# Patient Record
Sex: MALE | Race: WHITE | Employment: OTHER | URBAN - METROPOLITAN AREA
[De-identification: names, ages, dates, MRNs, and addresses within clinical notes are randomized per-mention and may not be internally consistent; named-entity substitution may affect disease eponyms.]

---

## 2017-01-10 ENCOUNTER — OFFICE VISIT (OUTPATIENT)
Dept: DERMATOLOGY | Facility: AMBULATORY SURGERY CENTER | Age: 72
End: 2017-01-10

## 2017-01-10 VITALS
BODY MASS INDEX: 28.75 KG/M2 | TEMPERATURE: 97.9 F | SYSTOLIC BLOOD PRESSURE: 126 MMHG | WEIGHT: 224 LBS | RESPIRATION RATE: 18 BRPM | DIASTOLIC BLOOD PRESSURE: 84 MMHG | OXYGEN SATURATION: 97 % | HEART RATE: 76 BPM | HEIGHT: 74 IN

## 2017-01-10 DIAGNOSIS — C44.319 BASAL CELL CARCINOMA OF LEFT FOREHEAD: Primary | ICD-10-CM

## 2017-01-10 DIAGNOSIS — D49.2 NEOPLASM OF SKIN OF SCALP: ICD-10-CM

## 2017-01-10 DIAGNOSIS — L82.0 SEBORRHEIC KERATOSES, INFLAMED: ICD-10-CM

## 2017-01-10 RX ORDER — PHENYTOIN SODIUM 100 MG/1
200 CAPSULE, EXTENDED RELEASE ORAL 2 TIMES DAILY
COMMUNITY
Start: 2016-11-03

## 2017-01-10 RX ORDER — GLUCOSAMINE SULFATE 1500 MG
5000 POWDER IN PACKET (EA) ORAL DAILY
COMMUNITY
End: 2021-10-25

## 2017-01-10 RX ORDER — MELOXICAM 15 MG/1
15 TABLET ORAL DAILY
COMMUNITY
End: 2021-11-04

## 2017-01-10 RX ORDER — PHENOL/SODIUM PHENOLATE
20 AEROSOL, SPRAY (ML) MUCOUS MEMBRANE DAILY
COMMUNITY

## 2017-01-10 RX ORDER — LIDOCAINE HYDROCHLORIDE AND EPINEPHRINE 10; 10 MG/ML; UG/ML
3 INJECTION, SOLUTION INFILTRATION; PERINEURAL ONCE
Qty: 3 ML | Refills: 0
Start: 2017-01-10 | End: 2017-01-10

## 2017-01-10 RX ORDER — IRBESARTAN 150 MG/1
150 TABLET ORAL
COMMUNITY
Start: 2016-11-03

## 2017-01-10 RX ORDER — BISMUTH SUBSALICYLATE 262 MG
1 TABLET,CHEWABLE ORAL DAILY
COMMUNITY
End: 2021-10-25

## 2017-01-10 RX ORDER — ASPIRIN 81 MG/1
TABLET ORAL DAILY
COMMUNITY
End: 2021-10-25

## 2017-01-10 RX ORDER — BUPIVACAINE HYDROCHLORIDE AND EPINEPHRINE 2.5; 5 MG/ML; UG/ML
INJECTION, SOLUTION INFILTRATION; PERINEURAL
Qty: 1.5 ML | Refills: 0
Start: 2017-01-10 | End: 2018-04-09 | Stop reason: ALTCHOICE

## 2017-01-10 NOTE — PROGRESS NOTES
This note is written by Yahaira Rodrigues, as dictated by Jd Escobedo. Ana M Merchant MD.    CC: Basal cell carcinoma on the left forehead    History of present illness:     Thais Acevedo is a 66 y.o. male referred by Dr. Gamal Sim. He has a biopsy-proven basal cell carcinoma on the left forehead. This is a new basal cell carcinoma present for more than 6 months described as a persistent, scabbing lesion, no pain, bleeding or itching and with no prior treatment. Biopsy confirmed the diagnosis of basal cell carcinoma, and I reviewed the written pathology report. He notes that he has several other scabbed lesions on his scalp, he believes irritated from treatment with a lice comb. He is feeling well and in his usual state of health today. He has no pain, no current illnesses, no other skin concerns. His allergies, medications, medical, and social history are reviewed by me today. He has a history of actinic keratoses. Exam:     He is an awake, alert, and oriented 66 y.o. male who appears well and in no distress. There is no preauricular, submandibular, or cervical lymphadenopathy. I examined his face and scalp. He has an 8 x 4 mm white and pink papule with scar on his left forehead corresponding to Dr. Bryan Jordan. He confirms location. There are scattered seborrheic keratoses on his scalp. He has an 8 x 8 mm pink papule with scar on his right vertex scalp, concerning for skin cancer. Assessment/plan:    1. Basal cell carcinoma, left forehead. I discussed the diagnosis of basal cell carcinoma and summarized the pathology report. Mohs surgery is indicated by site, size and poor definition. The procedure was discussed, verbal and written consent were obtained. I performed the procedure. One stage was required to reach a tumor free plane. The surgical defect was managed with intermediate repair. There were no complications. He will follow up as needed as the site heals.     Indications, risks, and options were discussed with Klaudia Hill preoperatively. Risks including, but not limited to: pain, bleeding, infection, tumor recurrence, scarring and damage to motor and/or sensory nerves, were discussed. Klaudia Hill chose Mohs surgery. Klaudia Hill was an acceptable surgery candidate. Klaudia Hill was placed in the appropriate position on the operating table in the Mohs surgery procedure room. The area was prepped and draped in the standard manner. Gentian violet was used to outline the clinical margins of the tumor. Local anesthesia was then obtained. The grossly visible tumor was then removed, an underlying layer was excised and mapped according to the Mohs technique, and the individual specimens examined microscopically. The process was repeated until microscopic examination of the tissue specimens confirmed a tumor-free plane. Hemostasis was obtained with electrosurgery and pressure. The wound was covered between stages with moist saline gauze. Wound care instructions (written and verbal) and a follow up appointment were given to Klaudia Hill before discharge. Klaudia Hill was discharged in good condition. The wound management options of second intent healing, layered closure, local flap, and/or full thickness skin graft were discussed. Klaudia Hill understands the aims, risks, alternatives, and possible complications and elects to proceed with an intermediate layered closure. Wound margins were made vertical, edges undermined in the subcutaneous plane, standing cones corrected at both poles followed by layered closure. The wound was closed with buried 6-0 polysorb suture in the subcutis to reduce tension on the skin edges, and skin edges were approximated with 6-0 polysorb suture in the dermis to reduce tension on the epidermis. The final closure length was 14 mm. The wound was bandaged with a pressure bandage utilizing skin glue, Telfa, gauze and Coverroll.  Wound care instructions (written and verbal) and a follow up appointment were given to Emmanuel Vargas before discharge. Emmanuel Vargas was discharged in good condition. 2. New growing lesion, right vertex scalp. I discussed the diagnosis and recommend that he follow up with Dr. Wilver Cisneros for observation and possible management. 3. History of actinic keratoses and skin cancer. I discussed the diagnosis and recommend routine examinations with Dr. Wilver Cisneros for surveillance. 4. Seborrheic keratoses on the scalp, irritated. I reviewed the diagnosis with him and reassured him. No treatment is necessary. The documentation recorded by the scribe accurately reflects the service I personally performed and the decisions made by me. [facility]   OFFICE PROCEDURE PROGRESS NOTE     Chart reviewed for the following:     Kayleigh Martinez MD, have reviewed the History, Physical and updated the Allergic reactions for [address] performed immediately prior to start of procedure:     Kayleigh Martinez MD, have performed the following reviews on Emmanuel Vargas prior to the start of the procedure:     * Patient was identified by name and date of birth   * Agreement on procedure being performed was verified   * Risks and Benefits explained to the patient   * Procedure site verified and marked as necessary   * Patient was positioned for comfort   * Consent was signed and verified     Time: 1:20 PM  Date of procedure: 1/10/2017  Procedure performed by: Olvin Martinez MD   Provider assisted by: LPN   Patient assisted by: self   How tolerated by patient: tolerated the procedure well with no complications   Comments: none

## 2017-01-10 NOTE — PATIENT INSTRUCTIONS
WOUND CARE INSTRUCTIONS    1. Keep the dressing clean and dry and do not remove for 48 hours. 2. Then change the dressing once a day as follows:  a. Wash hands before and after each dressing change. b. Remove dressing and wash site gently with mild soap and water, rinse, and pat dry.  c. Apply an ointment (Bacitracin, Polysporin, Neosporin, Petroleum jelly or Aquaphor). d. Apply a non-stick (Telfa) dressing or Band-Aid to cover the wound. Remove pressure bandage Thursday. You may shower daily at this point, no bandage necessary. Glue will eventually come off within the next 2 weeks. If you still feel rough glue at this point, you may apply vaseline to site daily until fully removed. 3. Watch for:  BLEEDING: A small amount of drainage may occur. If bleeding occurs, elevate and rest the surgery site. Apply gauze and steady pressure for 15 minutes. If bleeding continues, call this office. INFECTION: Signs of infection include increased redness, pain, warmth, drainage of pus, and fever. If this occurs, call this office. 4. Special Instructions (follow any that are checked):  · [] You have stitches that need to be removed in 0 days  · [x] Avoid bending at the waist and heavy lifting for two days. · [x] Sleep with your head elevated for the next two nights. · [x] Rest the surgery site and keep it elevated as much as possible for two days. · [x] You may apply an ice-pack for 10-15 minutes every waking hour for the rest of the day. · [] Eat a soft diet and avoid hot food and hot drinks for the rest of the day. · [] Other instructions: Follow up as needed  Take Tylenol for pain as needed. Once the site is healed with no remaining bandages or open areas, protect your surgical site and scar from the sun, as this area will be more sensitive.   Use a broad spectrum sunscreen SPF 30 or higher daily, and a chemical free product (one containing zinc oxide or titanium dioxide) is a good choice if the area is sensitive. You may begin to gently massage the surgical site in 2-3 weeks, rubbing in a circular motion along the scar. This can help reduce swelling and thickness of a scar. A scar cream may be used beginnning 1 month after the surgery. If you have any questions or concerns, please call our office Monday through Friday at [phone].

## 2017-01-10 NOTE — PROGRESS NOTES
Pre-op: Patient present today for the evaluation of basal cell carcinoma to the left forehead. Procedure explained with full understanding. Vitals:    01/10/17 1307   BP: 126/84   Pulse: 76   Resp: 18   Temp: 97.9 °F (36.6 °C)   TempSrc: Oral   SpO2: 97%   Weight: 101.6 kg (224 lb)   Height: 6' 1.5\" (1.867 m)     preoperatively, will continue to monitor. Post-op: Written and verbal post-op wound care instructions given to patient with full understanding of care. Surgical wound bandaged with Vaseline, Telfa, 2x2 gauze, and coverall tape. All questions and concerns addressed. Vitals stable postoperatively.

## 2017-01-10 NOTE — MR AVS SNAPSHOT
Visit Information Date & Time Provider Department Dept. Phone Encounter #  
 1/10/2017  1:00 PM Cons[facility] Hab[facility] [phone] Upcoming Health Maintenance Date Due DTaP/Tdap/Td series (1 - Tdap) 12/7/1964 FOBT Q 1 YEAR AGE 50-75 12/7/1993 ZOSTER VACCINE AGE 60> 12/7/2003 GLAUCOMA SCREENING Q2Y 12/7/2008 Pneumococcal 65+ Low/Medium Risk (1 of 2 - PCV13) 12/7/2008 MEDICARE YEARLY EXAM 12/7/2008 INFLUENZA AGE 9 TO ADULT 8/1/2016 Allergies as of 1/10/2017  Review Complete On: 1/10/2017 By: Pati Dale LPN No Known Allergies Current Immunizations  Never Reviewed No immunizations on file. Not reviewed this visit You Were Diagnosed With   
  
 Codes Comments Basal cell carcinoma of left forehead    -  Primary ICD-10-CM: C44.319 ICD-9-CM: 173.31 Vitals BP Pulse Temp Resp Height(growth percentile) Weight(growth percentile) 126/84 (BP 1 Location: Left arm, BP Patient Position: Sitting) 76 97.9 °F (36.6 °C) (Oral) 18 6' 1.5\" (1.867 m) 224 lb (101.6 kg) SpO2 BMI Smoking Status 97% 29.15 kg/m2 Never Smoker BMI and BSA Data Body Mass Index Body Surface Area  
 29.15 kg/m 2 2.3 m 2 Your Updated Medication List  
  
   
This list is accurate as of: 1/10/17  1:50 PM.  Always use your most recent med list.  
  
  
  
  
 aspirin delayed-release 81 mg tablet Take  by mouth daily. irbesartan 150 mg tablet Commonly known as:  AVAPRO  
  
 LOSARTAN PO Take 150 mg by mouth daily. meloxicam 15 mg tablet Commonly known as:  MOBIC Take 15 mg by mouth daily. As needed  
  
 multivitamin tablet Commonly known as:  ONE A DAY Take 1 Tab by mouth daily. Omeprazole delayed release 20 mg tablet Commonly known as:  PRILOSEC D/R Take 20 mg by mouth daily. phenytoin  mg ER capsule Commonly known as:  DILANTIN ER  
  
 VITAMIN D3 1,000 unit Cap Generic drug:  cholecalciferol Take 5,000 Units by mouth daily. Patient Instructions WOUND CARE INSTRUCTIONS 1. Keep the dressing clean and dry and do not remove for 48 hours. 2. Then change the dressing once a day as follows: 
a. Wash hands before and after each dressing change. b. Remove dressing and wash site gently with mild soap and water, rinse, and pat dry. 
c. Apply an ointment (Bacitracin, Polysporin, Neosporin, Petroleum jelly or Aquaphor). d. Apply a non-stick (Telfa) dressing or Band-Aid to cover the wound. Remove pressure bandage Thursday. You may shower daily at this point, no bandage necessary. Glue will eventually come off within the next 2 weeks. If you still feel rough glue at this point, you may apply vaseline to site daily until fully removed. 3. Watch for: BLEEDING: A small amount of drainage may occur. If bleeding occurs, elevate and rest the surgery site. Apply gauze and steady pressure for 15 minutes. If bleeding continues, call this office. INFECTION: Signs of infection include increased redness, pain, warmth, drainage of pus, and fever. If this occurs, call this office. 4. Special Instructions (follow any that are checked): ·  You have stitches that need to be removed in 0 days ·  Avoid bending at the waist and heavy lifting for two days. ·  Sleep with your head elevated for the next two nights. ·  Rest the surgery site and keep it elevated as much as possible for two days. ·  You may apply an ice-pack for 10-15 minutes every waking hour for the rest of the day. ·  Eat a soft diet and avoid hot food and hot drinks for the rest of the day. ·  Other instructions: Follow up as needed Take Tylenol for pain as needed.  
 
 
Once the site is healed with no remaining bandages or open areas, protect your surgical site and scar from the sun, as this area will be more sensitive. Use a broad spectrum sunscreen SPF 30 or higher daily, and a chemical free product (one containing zinc oxide or titanium dioxide) is a good choice if the area is sensitive. You may begin to gently massage the surgical site in 2-3 weeks, rubbing in a circular motion along the scar. This can help reduce swelling and thickness of a scar. A scar cream may be used beginnning 1 month after the surgery. If you have any questions or concerns, please call our office Monday through Friday at [phone]. Introducing [facility] & HEALTH SERVICES! Andrey Doss introduces [employer] patient portal. Now you can access parts of your medical record, email your doctor's office, and request medication refills online. 1. In your internet browser, go to [email or web address] Plug 2. Click on the First Time User? Click Here link in the Sign In box. You will see the New Member Sign Up page. 3. Enter your Mapbox Access Code exactly as it appears below. You will not need to use this code after youve completed the sign-up process. If you do not sign up before the expiration date, you must request a new code. · Mapbox Access Code: [email or web address] Expires: 4/10/2017  1:49 PM 
 
4. Enter the last four digits of your Social Security Number (xxxx) and Date of Birth (mm/dd/yyyy) as indicated and click Submit. You will be taken to the next sign-up page. 5. Create a Mapbox ID. This will be your Mapbox login ID and cannot be changed, so think of one that is secure and easy to remember. 6. Create a Mapbox password. You can change your password at any time. 7. Enter your Password Reset Question and Answer. This can be used at a later time if you forget your password. 8. Enter your e-mail address. You will receive e-mail notification when new information is available in [address] 9. Click Sign Up. You can now view and download portions of your medical record. 10. Click the Download Summary menu link to download a portable copy of your medical information. If you have questions, please visit the Frequently Asked Questions section of the [employer] website. Remember, [employer] is NOT to be used for urgent needs. For medical emergencies, dial 911. Now available from your iPhone and Android! Please provide this summary of care documentation to your next provider. If you have any questions after today's visit, please call [phone].

## 2018-03-28 ENCOUNTER — TELEPHONE (OUTPATIENT)
Dept: DERMATOLOGY | Facility: AMBULATORY SURGERY CENTER | Age: 73
End: 2018-03-28

## 2018-03-28 NOTE — TELEPHONE ENCOUNTER
Mohs Pre-Op Assessment    Patient Appointment Date: 4/9 @     Vilma Harper, 74 y.o., male      does confirm site: R superior parietal scalp  Brief description of tumor: BCC  does ID site. (Can they still visibly see the site)  does not have Hepatitis C   does not have HIV (If YES, set up consult appointment)    Allergies:  No Known Allergies    does not have an Electrical Implanted Device (Pacemaker, AICD, brain stimulator, etc.)    does not need antibiotics      is not taking NSAIDs    is taking aspirin  If yes, is taking because of prevention (i.e. heart attack, stroke, TIA, bypass surgery, etc.)    is not taking Garlic  is not taking Ginkgo  is not taking Ginseng  is not taking Fish oils  is not taking Vit E    does not take a blood thinner(i.e. Coumadin/Warfarin, Plavix, Brilinta, Pradaxa, Xarelto, Effient)  If taking Coumadin needs to have PT/INR drawn and faxed results within a week of surgery    Pre operative assessment questions asked to patient. Patient has a general understanding of the procedure, and has been versed that there will be local anesthesia used in the procedure and that He will be ok to drive themselves to and from the appointment. Patient has been notified to arrive 15-20 minutes early and they may eat or drink before arriving.

## 2018-03-28 NOTE — TELEPHONE ENCOUNTER
LVM regarding MOHs pre op assessment. Pt instructed to call back at earliest convenience.      Appointment on Mon 4/9 @ 1pm  Site: BCC R superior parietal scalp

## 2018-04-09 ENCOUNTER — OFFICE VISIT (OUTPATIENT)
Dept: DERMATOLOGY | Facility: AMBULATORY SURGERY CENTER | Age: 73
End: 2018-04-09

## 2018-04-09 VITALS
OXYGEN SATURATION: 95 % | BODY MASS INDEX: 29 KG/M2 | SYSTOLIC BLOOD PRESSURE: 140 MMHG | WEIGHT: 226 LBS | DIASTOLIC BLOOD PRESSURE: 78 MMHG | HEIGHT: 74 IN | HEART RATE: 84 BPM | RESPIRATION RATE: 18 BRPM

## 2018-04-09 DIAGNOSIS — C44.41 BASAL CELL CARCINOMA OF SCALP: Primary | ICD-10-CM

## 2018-04-09 NOTE — MR AVS SNAPSHOT
[address] [facility] [address] 
[phone] Patient: Carli Celaya MRN: [id number] SSI:10[birth year]9 Visit Information Date & Time Provider Department Dept. Phone Encounter #  
 4/9/2018  1:00 PM MD Garrett Alegria [phone] Upcoming Health Maintenance Date Due DTaP/Tdap/Td series (1 - Tdap) 12/7/1964 FOBT Q 1 YEAR AGE 50-75 12/7/1993 ZOSTER VACCINE AGE 60> 10/7/2003 GLAUCOMA SCREENING Q2Y 12/7/2008 Pneumococcal 65+ Low/Medium Risk (1 of 2 - PCV13) 12/7/2008 Influenza Age 5 to Adult 8/1/2017 MEDICARE YEARLY EXAM 3/14/2018 Allergies as of 4/9/2018  Review Complete On: 4/9/2018 By: Radha Streeter RN No Known Allergies Current Immunizations  Never Reviewed No immunizations on file. Not reviewed this visit You Were Diagnosed With   
  
 Codes Comments Basal cell carcinoma of scalp    -  Primary ICD-10-CM: C44.41 
ICD-9-CM: 173.41 Vitals BP Pulse Resp Height(growth percentile) Weight(growth percentile) SpO2  
 140/78 (BP 1 Location: Left arm, BP Patient Position: Sitting) 84 18 6' 1.5\" (1.867 m) 226 lb (102.5 kg) 95% BMI Smoking Status 29.41 kg/m2 Never Smoker BMI and BSA Data Body Mass Index Body Surface Area  
 29.41 kg/m 2 2.31 m 2 Your Updated Medication List  
  
   
This list is accurate as of 4/9/18  1:51 PM.  Always use your most recent med list.  
  
  
  
  
 aspirin delayed-release 81 mg tablet Take  by mouth daily. irbesartan 150 mg tablet Commonly known as:  AVAPRO  
  
 LOSARTAN PO Take 150 mg by mouth daily. meloxicam 15 mg tablet Commonly known as:  MOBIC Take 15 mg by mouth daily. As needed  
  
 multivitamin tablet Commonly known as:  ONE A DAY Take 1 Tab by mouth daily. Omeprazole delayed release 20 mg tablet Commonly known as:  PRILOSEC D/R Take 20 mg by mouth daily. phenytoin  mg ER capsule Commonly known as:  DILANTIN ER  
  
 VITAMIN D3 1,000 unit Cap Generic drug:  cholecalciferol Take 5,000 Units by mouth daily. Patient Instructions WOUND CARE INSTRUCTIONS 1. Keep the dressing clean and dry and do not remove for 24 hours. 2. Then change the dressing once a day as follows: 
a. Wash hands before and after each dressing change. b. Remove dressing and wash site gently with mild soap and water, rinse, and pat dry. 
c. Apply an ointment (Bacitracin, Polysporin, Neosporin, Petroleum jelly or Aquaphor). d. Apply a non-stick (Telfa) dressing or Band-Aid to cover the wound. Remove pressure bandage on Wednesday, then wash gently and apply a thin layer Vaseline and a band-aid to site daily for 1 week. 3. Watch for: BLEEDING: A small amount of drainage may occur. If bleeding occurs, elevate and rest the surgery site. Apply gauze and steady pressure for 15 minutes. If bleeding continues, call this office. INFECTION: Signs of infection include increased redness, pain, warmth, drainage of pus, and fever. If this occurs, call this office. 4. Special Instructions (follow any that are checked): 
· [] You have stitches that DO NOT need to be removed. · [x] Avoid bending at the waist and heavy lifting for two days. · [x] Sleep with your head elevated for the next two nights. · [x] Rest the surgery site and keep it elevated as much as possible for two days. · [x] You may apply an ice-pack for 10-15 minutes every waking hour for the rest of the day. · [] Eat a soft diet and avoid hot food and hot drinks for the rest of the day. · [] Other instructions: Follow up as directed. Take Tylenol or Ibuprofen for pain as needed.  
 
 
Once the site is healed with no remaining bandages or open areas, protect your surgical site and scar from the sun, as this area will be more sensitive. Use a broad spectrum sunscreen SPF 30 or higher daily, and a chemical free product (one containing zinc oxide or titanium dioxide) is a good choice if the area is sensitive. You may begin to gently massage the surgical site in 2-3 weeks, rubbing in a circular motion along the scar. This can help reduce swelling and thickness of a scar. A scar cream may be used beginnning 1 month after the surgery. If you have any questions or concerns, please call our office Monday through Friday at [phone]. Introducing [facility] & HEALTH SERVICES! [facility] introduces [employer] patient portal. Now you can access parts of your medical record, email your doctor's office, and request medication refills online. 1. In your internet browser, go to [email or web address] 2. Click on the First Time User? Click Here link in the Sign In box. You will see the New Member Sign Up page. 3. Enter your [employer] Access Code exactly as it appears below. You will not need to use this code after youve completed the sign-up process. If you do not sign up before the expiration date, you must request a new code. · [employer] Access Code: [email or web address] Expires: 6/11/2018  3:13 PM 
 
4. Enter the last four digits of your Social Security Number (xxxx) and Date of Birth (mm/dd/yyyy) as indicated and click Submit. You will be taken to the next sign-up page. 5. Create a [employer] ID. This will be your [employer] login ID and cannot be changed, so think of one that is secure and easy to remember. 6. Create a [employer] password. You can change your password at any time. 7. Enter your Password Reset Question and Answer. This can be used at a later time if you forget your password. 8. Enter your e-mail address. You will receive e-mail notification when new information is available in [address] 9. Click Sign Up. You can now view and download portions of your medical record. 10. Click the Download Summary menu link to download a portable copy of your medical information. If you have questions, please visit the Frequently Asked Questions section of the [employer] website. Remember, [employer] is NOT to be used for urgent needs. For medical emergencies, dial 911. Now available from your iPhone and Android! Please provide this summary of care documentation to your next provider. If you have any questions after today's visit, please call [phone].

## 2018-04-09 NOTE — PROGRESS NOTES
This note is written by Eleuterio Gavin, as dictated by Michelle Martinez. Marilynn Meyer MD.    CC: Basal cell carcinoma on the right vertex scalp    History of present illness:     Shlomo Magdaleno is a 74 y.o. male referred by Dr. Kayleigh Basilio. He has a biopsy-proven nodular basal cell carcinoma on the right vertex scalp. This is a new basal cell carcinoma present for more than six months described as a scab-like lesion with no prior treatment. Biopsy confirmed the diagnosis of basal cell carcinoma, and I reviewed the written pathology report. He is feeling well and in his usual state of health today. He has no pain, no current illnesses, no other skin concerns. His allergies, medications, medical, and social history are reviewed by me today. Exam:     He is an awake, alert, and oriented 74 y.o. male who appears well and in no distress. There is no preauricular, submandibular, or cervical lymphadenopathy. I examined his scalp. He has a 10 x 6 mm thin pink scar on his right vertex scalp. He confirms location. Assessment/plan:    1. Basal cell carcinoma, right vertex scalp. I discussed the diagnosis of basal cell carcinoma and summarized the pathology report. Mohs surgery is indicated by site and size. The procedure was discussed, verbal and written consent were obtained. I performed the procedure. One stage was required to reach a tumor free plane. The surgical defect was managed with second intent healing. There were no complications. He will follow up as needed as the site heals. Indications, risks, and options were discussed with Shlomo Magdaleno preoperatively. Risks including, but not limited to: pain, bleeding, infection, tumor recurrence, scarring and damage to motor and/or sensory nerves, were discussed. Shlomo Magdaleno chose Mohs surgery. Shlomo Magdaleno was an acceptable surgery candidate. Shlomo Magdaleno was placed in the appropriate position on the operating table in the Mohs surgery procedure room. The area was prepped and draped in the standard manner. Gentian violet was used to outline the clinical margins of the tumor. Local anesthesia was then obtained. The grossly visible tumor was then removed, an underlying layer was excised and mapped according to the Mohs technique, and the individual specimens examined microscopically. The process was repeated until microscopic examination of the tissue specimens confirmed a tumor-free plane. Hemostasis was obtained with electrosurgery and pressure. The wound was covered between stages with moist saline gauze. The wound was bandaged with Vaseline, Telfa, gauze, Coverroll. Wound care instructions (written and verbal) and a follow up appointment were given to Pal Reynoso before discharge. Pal Reynoso was discharged in good condition. 2. History of nonmelanoma skin cancer. I discussed the diagnosis and recommend routine examinations with Dr. Kandy Lee for surveillance. The documentation recorded by the scribe accurately reflects the service I personally performed and the decisions made by me. [facility]   OFFICE PROCEDURE PROGRESS NOTE     Chart reviewed for the following:     Clare Chavarria MD, have reviewed the History, Physical and updated the Allergic reactions for [address] performed immediately prior to start of procedure:     Clare Chavarria MD, have performed the following reviews on Pal Reynoso prior to the start of the procedure:     * Patient was identified by name and date of birth   * Agreement on procedure being performed was verified   * Risks and Benefits explained to the patient   * Procedure site verified and marked as necessary   * Patient was positioned for comfort   * Consent was signed and verified     Time: 1:07 PM  Date of procedure: 4/9/2018  Procedure performed by: Linda Galaviz.  Ruel Chavarria MD   Provider assisted by: RN  Patient assisted by: self   How tolerated by patient: tolerated the procedure well with no complications   Comments: none

## 2018-04-09 NOTE — PATIENT INSTRUCTIONS
WOUND CARE INSTRUCTIONS    1. Keep the dressing clean and dry and do not remove for 24 hours. 2. Then change the dressing once a day as follows:  a. Wash hands before and after each dressing change. b. Remove dressing and wash site gently with mild soap and water, rinse, and pat dry.  c. Apply an ointment (Bacitracin, Polysporin, Neosporin, Petroleum jelly or Aquaphor). d. Apply a non-stick (Telfa) dressing or Band-Aid to cover the wound. Remove pressure bandage on Wednesday, then wash gently and apply a thin layer Vaseline and a band-aid to site daily for 1 week. 3. Watch for:  BLEEDING: A small amount of drainage may occur. If bleeding occurs, elevate and rest the surgery site. Apply gauze and steady pressure for 15 minutes. If bleeding continues, call this office. INFECTION: Signs of infection include increased redness, pain, warmth, drainage of pus, and fever. If this occurs, call this office. 4. Special Instructions (follow any that are checked):  · [] You have stitches that DO NOT need to be removed. · [x] Avoid bending at the waist and heavy lifting for two days. · [x] Sleep with your head elevated for the next two nights. · [x] Rest the surgery site and keep it elevated as much as possible for two days. · [x] You may apply an ice-pack for 10-15 minutes every waking hour for the rest of the day. · [] Eat a soft diet and avoid hot food and hot drinks for the rest of the day. · [] Other instructions: Follow up as directed. Take Tylenol or Ibuprofen for pain as needed. Once the site is healed with no remaining bandages or open areas, protect your surgical site and scar from the sun, as this area will be more sensitive. Use a broad spectrum sunscreen SPF 30 or higher daily, and a chemical free product (one containing zinc oxide or titanium dioxide) is a good choice if the area is sensitive.     You may begin to gently massage the surgical site in 2-3 weeks, rubbing in a circular motion along the scar. This can help reduce swelling and thickness of a scar. A scar cream may be used beginnning 1 month after the surgery. If you have any questions or concerns, please call our office Monday through Friday at [phone].

## 2021-07-26 ENCOUNTER — TRANSCRIBE ORDER (OUTPATIENT)
Dept: SCHEDULING | Age: 76
End: 2021-07-26

## 2021-07-26 DIAGNOSIS — M54.50 LUMBAR SPINE PAIN: Primary | ICD-10-CM

## 2021-07-26 DIAGNOSIS — M48.062 SPINAL STENOSIS, LUMBAR REGION WITH NEUROGENIC CLAUDICATION: ICD-10-CM

## 2021-07-26 DIAGNOSIS — M43.16 SPONDYLOLISTHESIS OF LUMBAR REGION: ICD-10-CM

## 2021-07-26 DIAGNOSIS — M54.32 BILATERAL SCIATICA: ICD-10-CM

## 2021-07-26 DIAGNOSIS — M54.31 BILATERAL SCIATICA: ICD-10-CM

## 2021-07-26 DIAGNOSIS — M47.26 OSTEOARTHRITIS OF SPINE WITH RADICULOPATHY, LUMBAR REGION: ICD-10-CM

## 2021-08-03 ENCOUNTER — HOSPITAL ENCOUNTER (OUTPATIENT)
Dept: MRI IMAGING | Age: 76
Discharge: HOME | End: 2021-08-03
Attending: ORTHOPAEDIC SURGERY
Payer: MEDICARE

## 2021-08-03 DIAGNOSIS — M47.26 OSTEOARTHRITIS OF SPINE WITH RADICULOPATHY, LUMBAR REGION: ICD-10-CM

## 2021-08-03 DIAGNOSIS — M54.32 BILATERAL SCIATICA: ICD-10-CM

## 2021-08-03 DIAGNOSIS — M54.31 BILATERAL SCIATICA: ICD-10-CM

## 2021-08-03 DIAGNOSIS — M54.50 LUMBAR SPINE PAIN: ICD-10-CM

## 2021-08-03 DIAGNOSIS — M48.062 SPINAL STENOSIS, LUMBAR REGION WITH NEUROGENIC CLAUDICATION: ICD-10-CM

## 2021-08-03 DIAGNOSIS — M43.16 SPONDYLOLISTHESIS OF LUMBAR REGION: ICD-10-CM

## 2021-08-03 PROCEDURE — 72148 MRI LUMBAR SPINE W/O DYE: CPT

## 2021-10-25 ENCOUNTER — HOSPITAL ENCOUNTER (OUTPATIENT)
Dept: GENERAL RADIOLOGY | Age: 76
Discharge: HOME | End: 2021-10-25
Attending: ORTHOPAEDIC SURGERY
Payer: MEDICARE

## 2021-10-25 ENCOUNTER — HOSPITAL ENCOUNTER (OUTPATIENT)
Dept: PHYSICAL THERAPY | Age: 76
Discharge: HOME | End: 2021-10-25
Attending: ORTHOPAEDIC SURGERY
Payer: MEDICARE

## 2021-10-25 ENCOUNTER — HOSPITAL ENCOUNTER (OUTPATIENT)
Dept: PREADMISSION TESTING | Age: 76
Discharge: HOME | End: 2021-10-25
Attending: ORTHOPAEDIC SURGERY
Payer: MEDICARE

## 2021-10-25 VITALS
WEIGHT: 230.82 LBS | RESPIRATION RATE: 16 BRPM | DIASTOLIC BLOOD PRESSURE: 65 MMHG | OXYGEN SATURATION: 100 % | SYSTOLIC BLOOD PRESSURE: 129 MMHG | TEMPERATURE: 98.7 F | HEIGHT: 74 IN | BODY MASS INDEX: 29.62 KG/M2 | HEART RATE: 88 BPM

## 2021-10-25 LAB
25(OH)D3 SERPL-MCNC: 56.5 NG/ML (ref 30–100)
ABO + RH BLD: NORMAL
ALBUMIN SERPL-MCNC: 3.6 G/DL (ref 3.5–5)
ALBUMIN/GLOB SERPL: 1.1 {RATIO} (ref 1.1–2.2)
ALP SERPL-CCNC: 152 U/L (ref 45–117)
ALT SERPL-CCNC: 34 U/L (ref 12–78)
AMPHET UR QL SCN: NEGATIVE
ANION GAP SERPL CALC-SCNC: 2 MMOL/L (ref 5–15)
APPEARANCE UR: CLEAR
AST SERPL-CCNC: 18 U/L (ref 15–37)
BACTERIA URNS QL MICRO: NEGATIVE /HPF
BARBITURATES UR QL SCN: NEGATIVE
BENZODIAZ UR QL: NEGATIVE
BILIRUB SERPL-MCNC: 0.3 MG/DL (ref 0.2–1)
BILIRUB UR QL: NEGATIVE
BLOOD GROUP ANTIBODIES SERPL: NORMAL
BUN SERPL-MCNC: 23 MG/DL (ref 6–20)
BUN/CREAT SERPL: 22 (ref 12–20)
CALCIUM SERPL-MCNC: 9.2 MG/DL (ref 8.5–10.1)
CANNABINOIDS UR QL SCN: NEGATIVE
CHLORIDE SERPL-SCNC: 106 MMOL/L (ref 97–108)
CO2 SERPL-SCNC: 28 MMOL/L (ref 21–32)
COCAINE UR QL SCN: NEGATIVE
COLOR UR: NORMAL
CREAT SERPL-MCNC: 1.03 MG/DL (ref 0.7–1.3)
DRUG SCRN COMMENT,DRGCM: NORMAL
EPITH CASTS URNS QL MICRO: NORMAL /LPF
ERYTHROCYTE [DISTWIDTH] IN BLOOD BY AUTOMATED COUNT: 12.8 % (ref 11.5–14.5)
EST. AVERAGE GLUCOSE BLD GHB EST-MCNC: 108 MG/DL
GLOBULIN SER CALC-MCNC: 3.2 G/DL (ref 2–4)
GLUCOSE SERPL-MCNC: 104 MG/DL (ref 65–100)
GLUCOSE UR STRIP.AUTO-MCNC: NEGATIVE MG/DL
HBA1C MFR BLD: 5.4 % (ref 4–5.6)
HCT VFR BLD AUTO: 37.9 % (ref 36.6–50.3)
HGB BLD-MCNC: 12.4 G/DL (ref 12.1–17)
HGB UR QL STRIP: NEGATIVE
HYALINE CASTS URNS QL MICRO: NORMAL /LPF (ref 0–5)
INR PPP: 1.1 (ref 0.9–1.1)
KETONES UR QL STRIP.AUTO: NEGATIVE MG/DL
LEUKOCYTE ESTERASE UR QL STRIP.AUTO: NEGATIVE
MCH RBC QN AUTO: 30.9 PG (ref 26–34)
MCHC RBC AUTO-ENTMCNC: 32.7 G/DL (ref 30–36.5)
MCV RBC AUTO: 94.5 FL (ref 80–99)
METHADONE UR QL: NEGATIVE
NITRITE UR QL STRIP.AUTO: NEGATIVE
NRBC # BLD: 0 K/UL (ref 0–0.01)
NRBC BLD-RTO: 0 PER 100 WBC
OPIATES UR QL: NEGATIVE
PCP UR QL: NEGATIVE
PH UR STRIP: 6 [PH] (ref 5–8)
PLATELET # BLD AUTO: 170 K/UL (ref 150–400)
PMV BLD AUTO: 10.7 FL (ref 8.9–12.9)
POTASSIUM SERPL-SCNC: 4.5 MMOL/L (ref 3.5–5.1)
PREALB SERPL-MCNC: 25.6 MG/DL (ref 20–40)
PROT SERPL-MCNC: 6.8 G/DL (ref 6.4–8.2)
PROT UR STRIP-MCNC: NEGATIVE MG/DL
PROTHROMBIN TIME: 11.3 SEC (ref 9–11.1)
RBC # BLD AUTO: 4.01 M/UL (ref 4.1–5.7)
RBC #/AREA URNS HPF: NORMAL /HPF (ref 0–5)
SODIUM SERPL-SCNC: 136 MMOL/L (ref 136–145)
SP GR UR REFRACTOMETRY: 1.02 (ref 1–1.03)
SPECIMEN EXP DATE BLD: NORMAL
UA: UC IF INDICATED,UAUC: NORMAL
UROBILINOGEN UR QL STRIP.AUTO: 0.2 EU/DL (ref 0.2–1)
WBC # BLD AUTO: 5.3 K/UL (ref 4.1–11.1)
WBC URNS QL MICRO: NORMAL /HPF (ref 0–4)

## 2021-10-25 PROCEDURE — 97116 GAIT TRAINING THERAPY: CPT

## 2021-10-25 PROCEDURE — 80053 COMPREHEN METABOLIC PANEL: CPT

## 2021-10-25 PROCEDURE — 36415 COLL VENOUS BLD VENIPUNCTURE: CPT

## 2021-10-25 PROCEDURE — 80307 DRUG TEST PRSMV CHEM ANLYZR: CPT

## 2021-10-25 PROCEDURE — 71046 X-RAY EXAM CHEST 2 VIEWS: CPT

## 2021-10-25 PROCEDURE — 82306 VITAMIN D 25 HYDROXY: CPT

## 2021-10-25 PROCEDURE — 84134 ASSAY OF PREALBUMIN: CPT

## 2021-10-25 PROCEDURE — 86901 BLOOD TYPING SEROLOGIC RH(D): CPT

## 2021-10-25 PROCEDURE — 85610 PROTHROMBIN TIME: CPT

## 2021-10-25 PROCEDURE — 81001 URINALYSIS AUTO W/SCOPE: CPT

## 2021-10-25 PROCEDURE — 85027 COMPLETE CBC AUTOMATED: CPT

## 2021-10-25 PROCEDURE — 83036 HEMOGLOBIN GLYCOSYLATED A1C: CPT

## 2021-10-25 PROCEDURE — 97161 PT EVAL LOW COMPLEX 20 MIN: CPT

## 2021-10-25 RX ORDER — ACETAMINOPHEN 500 MG
1000 TABLET ORAL ONCE
Status: CANCELLED | OUTPATIENT
Start: 2021-11-01 | End: 2021-11-01

## 2021-10-25 RX ORDER — PREGABALIN 150 MG/1
150 CAPSULE ORAL ONCE
Status: CANCELLED | OUTPATIENT
Start: 2021-11-01 | End: 2021-11-01

## 2021-10-25 RX ORDER — CHOLECALCIFEROL TAB 125 MCG (5000 UNIT) 125 MCG
5000 TAB ORAL
COMMUNITY

## 2021-10-25 RX ORDER — SODIUM CHLORIDE, SODIUM LACTATE, POTASSIUM CHLORIDE, CALCIUM CHLORIDE 600; 310; 30; 20 MG/100ML; MG/100ML; MG/100ML; MG/100ML
25 INJECTION, SOLUTION INTRAVENOUS CONTINUOUS
Status: CANCELLED | OUTPATIENT
Start: 2021-11-01

## 2021-10-25 NOTE — PERIOP NOTES
Incentive Spirometer        Using the incentive spirometer helps expand the small air sacs of your lungs, helps you breathe deeply, and helps improve your lung function. Use your incentive spirometer twice a day (10 breaths each time) prior to surgery. How to Use Your Incentive Spirometer:  1. Hold the incentive spirometer in an upright position. 2. Breathe out as usual.   3. Place the mouthpiece in your mouth and seal your lips tightly around it. 4. Take a deep breath. Breathe in slowly and as deeply as possible. Keep the blue flow rate guide between the arrows. 5. Hold your breath as long as possible. Then exhale slowly and allow the piston to fall to the bottom of the column. 6. Rest for a few seconds and repeat steps one through five at least 10 times. PAT Tidal Volume____2500__________  x____2____________  Date_10/25/2021_____    Cristian Justicen THE INCENTIVE SPIROMETER WITH YOU TO THE HOSPITAL ON THE DAY OF YOUR SURGERY. Opportunity given to ask and answer questions as well as to observe return demonstration.     Patient signature_____________________________          Witness____________________________

## 2021-10-25 NOTE — PERIOP NOTES
Hibiclens/Chlorhexidine    Preventing Infections Before and After  Your Surgery    IMPORTANT INSTRUCTIONS    Please read and follow these instructions carefully. If you are unable to comply with the below instructions your procedure will be cancelled. Every Night for Three (3) nights before your surgery:  1. Shower with an antibacterial soap, such as Dial, or the soap provided at your preassessment appointment. A shower is better than a bath for cleaning your skin. 2. If needed, ask someone to help you reach all areas of your body. Dont forget to clean your belly button with every shower. The night before your surgery: If you lose your Hibiclens/chlorhexidine please contact surgery center or you can purchase it at a local pharmacy  1. On the night before your surgery, shower with an antibacterial soap, such as Dial, or the soap provided at your preassessment appointment. 2. With one packet of Hibiclens/Chlorhexidine in hand, turn water off.  3. Apply Hibiclens antiseptic skin cleanser with a clean, freshly washed washcloth. ? Gently apply to your body from chin to toes (except the genital area) and especially the area(s) where your incision(s) will be. ? Leave Hibiclens/Chlorhexidine on your skin for at least 20 seconds. CAUTION: If needed, Hibiclens/chlorhexidine may be used to clean the folds of skin of the legs (such as in the area of the groin) and on your buttocks and hips. However, do not use Hibiclens/Chlorhexidine above the neck or in the genital area (your bottom) or put inside any area of your body. 4. Turn the water back on and rinse. 5. Dry gently with a clean, freshly washed towel. 6. After your shower, do not use any powder, deodorant, perfumes or lotion. 7. Use clean, freshly washed towels and washcloths every time you shower. 8. Wear clean, freshly washed pajamas to bed the night before surgery. 9. Sleep on clean, freshly washed sheets.   10. Do not allow pets to sleep in your bed with you. The Morning of your surgery:  1. Shower again thoroughly with an antibacterial soap, such as Dial or the soap provided at your preassessment appointment. If needed, ask someone for help to reach all areas of your body. Dont forget to clean your belly button! Rinse. 2. Dry gently with a clean, freshly washed towel. 3. After your shower, do not use any powder, deodorant, perfumes or lotion prior to surgery. 4. Put on clean, freshly washed clothing. Tips to help prevent infections after your surgery:  1. Protect your surgical wound from germs:  ? Hand washing is the most important thing you and your caregivers can do to prevent infections. ? Keep your bandage clean and dry! ? Do not touch your surgical wound. 2. Use clean, freshly washed towels and washcloths every time you shower; do not share bath linens with others. 3. Until your surgical wound is healed, wear clothing and sleep on bed linens each day that are clean and freshly washed. 4. Do not allow pets to sleep in your bed with you or touch your surgical wound. 5. Do not smoke  smoking delays wound healing. This may be a good time to stop smoking. 6. If you have diabetes, it is important for you to manage your blood sugar levels properly before your surgery as well as after your surgery. Poorly managed blood sugar levels slow down wound healing and prevent you from healing completely. If you lose your Hibiclens/chlorhexidine, please call the [facility], or it is available for purchase at your pharmacy.                ___________________      ___________________      10/25/2021 @ 1430  (Signature of Patient)          (Witness)                   (Date and Time)

## 2021-10-25 NOTE — PERIOP NOTES
[facility]  Joint/Spine Preoperative Instructions    Surgery Date 11/1/2021          Time of Arrival 1030  Contact # [phone] cell    1. On the day of your surgery, please report to the Surgical Services Registration Desk and sign in at your designated time. The Surgery Center is located to the right of the Emergency Room. 2. You must have someone with you to drive you home. You should not drive a car for 24 hours following surgery. Please make arrangements for a friend or family member to stay with you for the first 24 hours after your surgery. 3. No food after midnight 10/31/2021. Medications morning of surgery should be taken with a sip of water. Please follow pre-surgery drink instructions that were given at your Pre Admission Testing appointment. 4. We recommend you do not drink any alcoholic beverages for 24 hours before and after your surgery. 5. Contact your surgeons office for instructions on the following medications: non-steroidal anti-inflammatory drugs (i.e. Advil, Aleve), vitamins, and supplements. (Some surgeons will want you to stop these medications prior to surgery and others may allow you to take them)  **If you are currently taking Plavix, Coumadin, Aspirin and/or other blood-thinning agents, contact your surgeon for instructions. ** Your surgeon will partner with the physician prescribing these medications to determine if it is safe to stop or if you need to continue taking. Please do not stop taking these medications without instructions from your surgeon    6. Wear comfortable clothes. Wear glasses instead of contacts. Do not bring any money or jewelry. Please bring picture ID, insurance card, and any prearranged co-payment or hospital payment. Do not wear make-up, particularly mascara the morning of your surgery. Do not wear nail polish, particularly if you are having foot /hand surgery.   Wear your hair loose or down, no ponytails, buns, ketty pins or clips. All body piercings must be removed. Please shower with antibacterial soap for three consecutive days before and on the morning of surgery, but do not apply any lotions, powders or deodorants after the shower on the day of surgery. Please use a fresh towels after each shower. Please sleep in clean clothes and change bed linens the night before surgery. Please do not shave for 48 hours prior to surgery. Shaving of the face is acceptable. 7. You should understand that if you do not follow these instructions your surgery may be cancelled. If your physical condition changes (I.e. fever, cold or flu) please contact your surgeon as soon as possible. 8. It is important that you be on time. If a situation occurs where you may be late, please call [phone] (OR Holding Area). 9. If you have any questions and or problems, please call [phone] (Pre-admission Testing). 10. Your surgery time may be subject to change. You will receive a phone call the evening prior if your time changes. 11.  If having outpatient surgery, you must have someone to drive you here, stay with you during the duration of your stay, and to drive you home at time of discharge. 12. The following link is for the educational video for patients and/or families. http://[facility]/. [email or web address]-[email or web address]-[email or web address]-materials    Special Instructions:   Patient is scheduled for a Covid-19 test @ [facility] between 7am - 12/noon on Thursday, 10/28/2021; patient was instructed to self quarantine after test through day of surgery/procedure. Kita Santo TAKE ALL MEDICATIONS THE DAY OF SURGERY EXCEPT: Vitamins/supplements      I understand a pre-operative phone call will be made to verify my surgery time. In the event that I am not available, I give permission for a message to be left on my answering service and/or with another person? yes         ___________________        __________   10/25/2021 @ 1430    (Signature of Patient)             (Witness)                (Date and Time)

## 2021-10-25 NOTE — PROGRESS NOTES
[facility]  Physical Therapy Pre-surgery evaluation  [address], [address]    PHYSICAL THERAPY PRE SPINE SURGERY EVALUATION  Patient:Sin Barry (77 y.o. 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 Pt states he has remained independent but notices that his legs, mireille quads, become very fatigued and legs feel weak with walking. He states he has some burning and numbness in L foot. He also notes plantarflexion weakness when ascending stairs and is noted to have accelerated heel strike to foot flat in observed gait. Discussed anticipated disposition to home with possible discharge within a 1 to 2 day time frame post-surgery. Patient and  in agreement. Anticipate patient will need acute PT and OT orders based on current and exptected deficits post surgery. Pt is apprehensive about surgeries. He is scheduled for 2 part surgery - he states 3pm on first day and 8 am on second. He has many questions and needs as detailed answers as possible for his comfort. GOALS: (Goals have been discussed and agreed upon with patient.)  DISCHARGE GOALS: Time Frame: 1 DAY  1. Patient will demonstrate increased strength, range of motion, and pain control via a home exercise program in order to minimize functional deficits in preparation for their upcoming surgery.  This will be achieved by using education, demonstration and through the use of an informational handout including a home exercise program.  REHABILITATION POTENTIAL FOR STATED GOALS: Good     RECOMMENDATIONS AND PLANNED INTERVENTIONS: (Benefits and precautions of physical therapy have been discussed with the patient.)  · Home Exercise Program  TREATMENT PLAN EFFECTIVE DATES: 10/25/2021 to 10/25/2021   FREQUENCY/DURATION: Patient to continue to perform home exercise program at least twice daily until surgery. SUBJECTIVE:   Patient stated My legs just feel so tired.     OBJECTIVE DATA SUMMARY:   HISTORY:      Past Medical History:   Diagnosis Date    Epilepsy ([address]     Essential hypertension     Skin cancer      Past Surgical History:   Procedure Laterality Date    HX MOHS PROCEDURES  04/09/2018    BCC R vertex scalp by Dr. Bee Martins         Prior Level of Function/Home Situation: Pt states he has remained independent but notices that his legs, mireille quads, become very fatigued and legs feel weak with walking. He states he has some burning and numbness in L foot. He also notes plantarflexion weakness when ascending stairs and is noted to have accelerated heel strike to foot flat in observed gait. Personal factors and/or comorbidities impacting plan of care: wife accompanies pt and is taking many notes but appears to have some insight issues; they state their son will be coming to stay with them post surgery to assist      Home Situation  Home Environment: Private residence  # Steps to Enter: 5  Rails to Enter: Yes  Hand Rails : Bilateral  One/Two Story Residence: Two story, live on 1st floor  Height of Each Step (in): 13 inches  Interior Rails: Right  Living Alone: No  Support Systems: Spouse/Significant Other  Patient Expects to be Discharged to[de-identified] House  Current DME Used/Available at Home: Cane, straight (having grab bars installed in BR)  Tub or Shower Type: Shower          EXAMINATION/PRESENTATION/DECISION MAKING:     ADLs (Current Functional Status):    Bathing/Showering:   [x] Independent  [] Requires Assistance from Someone  [] Sponge Bath Only   Ambulation:  [x] Independent  [] Walk Indoors Only  [] Walk Outdoors  [] Use Assistive Device  [] Use Wheelchair Only     Dressing:  [x] Independent    Requires Assistance from Someone for:  [] Sock/Shoes  [] Pants  [] Everything   Household Activities:  [x] Routine house and yard work  [] Light Housework Only  [] None       Critical Behavior:  Neurologic State: Alert  Orientation Level: Oriented X4  Cognition: Follows commands       Strength:     Strength: Generally decreased, functional (functional plantar flexion weakness in gait)                       Tone & Sensation:   Tone: Normal              Sensation: Impaired (numbness L foot lateral dorsum to great toe)                  Range Of Motion:     AROM: Within functional limits           PROM: Within functional limits                Coordination:   Coordination: Within functional limits    Functional Mobility:  Transfers:  Sit to Stand: Independent  Stand to Sit: Independent                       Balance:   Sitting: Intact  Standing: Intact  Ambulation/Gait Training:  Distance (ft): 150 Feet (ft)  Assistive Device:  (none)  Ambulation - Level of Assistance: Independent        Gait Abnormalities: 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  1.39 ? 1.40 ? 1.36 ? 1.30 ? 1.33 ? 1.27 ? 1.21 ? 1.15   CI   1-19% Impaired 1.11-1.38 1.12-1.39 1.09-1.35 1.04-1.29 1.06-1.32 1.01-1.26 0.96-1.20 0.92-1.14   CJ   20-39% Impaired 0.83-1.10 0.84-1.11 0.82-1.08 0.78-1.03 0.80-1.05 0.76-1.00 0.72-0.95 0.69-0.91 CK   40-59% Impaired 0.56-0.82 0.57-0.83 0.54-0.81 0.52-0.77 0.53-0.79 0.51-0.75 0.48-0.71 0.46-0.68   CL   60-79% Impaired 0.28-0.55 0.28-0.56 0.27-0.53 0.26-0.51 0.27-0.52 0.25-0.50 0.24-0.49 0.23-0.45   CM   80-99% Impaired 0.01-0.28 < 0.01-0.28 < 0.01-0.27 < 0.01-0.26 0.01-0.27 0.01-0.24 0.01-0.23 0.01-0.22   CN   100% Impaired Cannot Perform   Minimal Detectable Change (MDC-90) = 0.1 m/s  Roselia BELTRÁN. \"Comfortable and maximum walking speed of adults aged 20-79 years: reference values and determinants. \" Age and Agin Volume 26(1):15-9. Ignacio Marin. \"Age- and gender-related test performance in community-dwelling elderly people: Six-Minute Walk Test, Garcia Balance Scale, Timed Up & Go Test, and gait speeds. \" Physical Therapy: 2002 Volume 82(2):128-37. River PETERSON, Daria DURAND, Ailin ARAUZD, [facility] D. \"Assessing stability and change of four performance measures: a longitudinal study evaluating outcome following total hip and knee arthroplasty. \" [facility] Musculoskeletal Disorders: 2005 Volume 6(3). Renetta Freeman, PhD; Aaron Nicole, PhD. Brandi Batsheva Paper: \"Walking Speed: the Sixth Vital Sign\" Journal of Geriatric Physical Therapy: 2009 - Volume 32 - Issue 2 - p 25 . Pain:   0/10 currently       Activity Tolerance:   Good for session   Patient []   does  [x]   does not demonstrate signs/symptoms of shortness of breath/dyspnea on exertion/respiratory distress. COMMUNICATION/EDUCATION:   The patient was educated on:  [x]         Early post operative mobility is imperative to achieve a patient's desired outcomes and to restore biological function. [x]         Post operative spinal precautions may/may not be applicable. These precautions are based on the patient's physician and the procedure(s) performed.     [x]         Spinal precautions including:  ·   No bending forward, sideways, or backwards  ·   No twisting   ·   No lifting more than 5-10 pounds  ·   No sitting longer than 30-60 minutes at a time  ·   Corbin brace when out of bed and mobilizing    The patients plan of care was discussed as follows:   [x]         The patient verbalized understanding of his/her plan in preparation for their upcoming surgery  [x]         The patient's  was present for this session  []        The patient reports that he/she does not have a  identified at this time  [x]         The  verbalized understanding of the education regarding the patient's upcoming surgery - wife appears to have some difficulty with the details; son will be coming to assist post surgeries  [x]         Patient/family agree to work toward stated goals and plan of care. []         Patient understands intent and goals of therapy, but is neutral about his/her participation. []         Patient is unable to participate in goal setting and plan of care.       Thank you for this referral.  Rebecca Hall, PT    Time Calculation: 29 mins

## 2021-10-25 NOTE — PERIOP NOTES
[facility]  \"We've Got Your Back! Caring For Yourself Before and After Spine Surgery\" handbook was provided & reviewed with the patient during the pre-admission testing (PAT) appointment. An opportunity for questions was provided, patient verbalized understanding.

## 2021-10-25 NOTE — PERIOP NOTES
Orthopedic and Spine Patients: Instructions on When You Can   Eat or Drink Before Surgery      You have been provided 2 pre-surgery drinks received at your pre-admission testing appointment.  Night before surgery:  o You should drink one bottle of the  pre-surgery drink at bedtime. No food after midnight!  Day of Surgery:  o Complete 2nd bottle of the pre-surgery drink 1 hour prior to arrival at hospital.  For questions call Pre-Admission Testing at [phone]. They are available from 8:00am-5:00pm, Monday through Friday.

## 2021-10-26 LAB
BACTERIA SPEC CULT: NORMAL
BACTERIA SPEC CULT: NORMAL
SERVICE CMNT-IMP: NORMAL
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
## 2021-10-28 ENCOUNTER — HOSPITAL ENCOUNTER (OUTPATIENT)
Dept: PREADMISSION TESTING | Age: 76
Discharge: HOME | End: 2021-10-28
Payer: MEDICARE

## 2021-10-28 PROCEDURE — U0005 INFEC AGEN DETEC AMPLI PROBE: HCPCS

## 2021-10-30 LAB
SARS-COV-2, XPLCVT: NOT DETECTED
SOURCE, COVRS: NORMAL

## 2021-11-01 ENCOUNTER — APPOINTMENT (OUTPATIENT)
Dept: GENERAL RADIOLOGY | Age: 76
DRG: 455 | End: 2021-11-01
Attending: ORTHOPAEDIC SURGERY
Payer: MEDICARE

## 2021-11-01 ENCOUNTER — APPOINTMENT (OUTPATIENT)
Dept: CT IMAGING | Age: 76
DRG: 455 | End: 2021-11-01
Attending: ORTHOPAEDIC SURGERY
Payer: MEDICARE

## 2021-11-01 ENCOUNTER — ANESTHESIA EVENT (OUTPATIENT)
Dept: SURGERY | Age: 76
DRG: 455 | End: 2021-11-01
Payer: MEDICARE

## 2021-11-01 ENCOUNTER — ANESTHESIA (OUTPATIENT)
Dept: SURGERY | Age: 76
DRG: 455 | End: 2021-11-01
Payer: MEDICARE

## 2021-11-01 ENCOUNTER — HOSPITAL ENCOUNTER (INPATIENT)
Age: 76
LOS: 2 days | Discharge: HOME HEALTH | DRG: 455 | End: 2021-11-04
Attending: ORTHOPAEDIC SURGERY | Admitting: ORTHOPAEDIC SURGERY
Payer: MEDICARE

## 2021-11-01 DIAGNOSIS — Z98.1 S/P LUMBAR SPINAL FUSION: Primary | ICD-10-CM

## 2021-11-01 PROBLEM — G89.18 POST-OP PAIN: Status: ACTIVE | Noted: 2021-11-01

## 2021-11-01 PROCEDURE — C1821 INTERSPINOUS IMPLANT: HCPCS | Performed by: ORTHOPAEDIC SURGERY

## 2021-11-01 PROCEDURE — 76000 FLUOROSCOPY <1 HR PHYS/QHP: CPT

## 2021-11-01 PROCEDURE — 77030040361 HC SLV COMPR DVT MDII -B: Performed by: ORTHOPAEDIC SURGERY

## 2021-11-01 PROCEDURE — G0378 HOSPITAL OBSERVATION PER HR: HCPCS

## 2021-11-01 PROCEDURE — 74011000250 HC RX REV CODE- 250: Performed by: NURSE ANESTHETIST, CERTIFIED REGISTERED

## 2021-11-01 PROCEDURE — 07DR3ZZ EXTRACTION OF ILIAC BONE MARROW, PERCUTANEOUS APPROACH: ICD-10-PCS | Performed by: ORTHOPAEDIC SURGERY

## 2021-11-01 PROCEDURE — 74011000250 HC RX REV CODE- 250: Performed by: ORTHOPAEDIC SURGERY

## 2021-11-01 PROCEDURE — 74011250636 HC RX REV CODE- 250/636: Performed by: NURSE ANESTHETIST, CERTIFIED REGISTERED

## 2021-11-01 PROCEDURE — 99218 HC RM OBSERVATION: CPT

## 2021-11-01 PROCEDURE — 77030008462 HC STPLR SKN PROX J&J -A: Performed by: ORTHOPAEDIC SURGERY

## 2021-11-01 PROCEDURE — 74011250636 HC RX REV CODE- 250/636: Performed by: ANESTHESIOLOGY

## 2021-11-01 PROCEDURE — 74011250637 HC RX REV CODE- 250/637: Performed by: ORTHOPAEDIC SURGERY

## 2021-11-01 PROCEDURE — 72100 X-RAY EXAM L-S SPINE 2/3 VWS: CPT

## 2021-11-01 PROCEDURE — 2709999900 HC NON-CHARGEABLE SUPPLY: Performed by: ORTHOPAEDIC SURGERY

## 2021-11-01 PROCEDURE — C1713 ANCHOR/SCREW BN/BN,TIS/BN: HCPCS | Performed by: ORTHOPAEDIC SURGERY

## 2021-11-01 PROCEDURE — 0SG00A0 FUSION OF LUMBAR VERTEBRAL JOINT WITH INTERBODY FUSION DEVICE, ANTERIOR APPROACH, ANTERIOR COLUMN, OPEN APPROACH: ICD-10-PCS | Performed by: ORTHOPAEDIC SURGERY

## 2021-11-01 PROCEDURE — 74011250636 HC RX REV CODE- 250/636: Performed by: ORTHOPAEDIC SURGERY

## 2021-11-01 PROCEDURE — 77030026438 HC STYL ET INTUB CARD -A: Performed by: ANESTHESIOLOGY

## 2021-11-01 PROCEDURE — 76210000016 HC OR PH I REC 1 TO 1.5 HR: Performed by: ORTHOPAEDIC SURGERY

## 2021-11-01 PROCEDURE — 77030033138 HC SUT PGA STRATFX J&J -B: Performed by: ORTHOPAEDIC SURGERY

## 2021-11-01 PROCEDURE — 96374 THER/PROPH/DIAG INJ IV PUSH: CPT

## 2021-11-01 PROCEDURE — 77030003445 HC NDL BIOP BN BD -B: Performed by: ORTHOPAEDIC SURGERY

## 2021-11-01 PROCEDURE — 77030003029 HC SUT VCRL J&J -B: Performed by: ORTHOPAEDIC SURGERY

## 2021-11-01 PROCEDURE — 77030008684 HC TU ET CUF COVD -B: Performed by: ANESTHESIOLOGY

## 2021-11-01 PROCEDURE — 0ST20ZZ RESECTION OF LUMBAR VERTEBRAL DISC, OPEN APPROACH: ICD-10-PCS | Performed by: ORTHOPAEDIC SURGERY

## 2021-11-01 PROCEDURE — 77030020704 HC DISECT ENDOSC BLNT J&J -B: Performed by: ORTHOPAEDIC SURGERY

## 2021-11-01 PROCEDURE — 76010000162 HC OR TIME 1.5 TO 2 HR INTENSV-TIER 1: Performed by: ORTHOPAEDIC SURGERY

## 2021-11-01 PROCEDURE — 76060000034 HC ANESTHESIA 1.5 TO 2 HR: Performed by: ORTHOPAEDIC SURGERY

## 2021-11-01 PROCEDURE — 72131 CT LUMBAR SPINE W/O DYE: CPT

## 2021-11-01 PROCEDURE — 77030034479 HC ADH SKN CLSR PRINEO J&J -B: Performed by: ORTHOPAEDIC SURGERY

## 2021-11-01 PROCEDURE — 77030003028 HC SUT VCRL J&J -A: Performed by: ORTHOPAEDIC SURGERY

## 2021-11-01 PROCEDURE — 77030020268 HC MISC GENERAL SUPPLY: Performed by: ORTHOPAEDIC SURGERY

## 2021-11-01 PROCEDURE — 77030018723 HC ELCTRD BLD COVD -A: Performed by: ORTHOPAEDIC SURGERY

## 2021-11-01 PROCEDURE — 77030013079 HC BLNKT BAIR HGGR 3M -A: Performed by: ANESTHESIOLOGY

## 2021-11-01 PROCEDURE — 77030014647 HC SEAL FBRN TISSL BAXT -D: Performed by: ORTHOPAEDIC SURGERY

## 2021-11-01 PROCEDURE — 77030005513 HC CATH URETH FOL11 MDII -B: Performed by: ORTHOPAEDIC SURGERY

## 2021-11-01 DEVICE — PLYMOUTH THORACOLUMBAR PLATE, L4-L5, 21MM
Type: IMPLANTABLE DEVICE | Site: SPINE LUMBAR | Status: FUNCTIONAL
Brand: PLYMOUTH

## 2021-11-01 DEVICE — RISE-L SPACER 22 X 60MM, 10-17MM, 10DEG
Type: IMPLANTABLE DEVICE | Site: SPINE LUMBAR | Status: FUNCTIONAL
Brand: RISE

## 2021-11-01 DEVICE — GRAFT BONE 5 CC: Type: IMPLANTABLE DEVICE | Site: SPINE LUMBAR | Status: FUNCTIONAL

## 2021-11-01 DEVICE — 5.5MM VARIABLE ANGLE SCREW, 30MM
Type: IMPLANTABLE DEVICE | Site: SPINE LUMBAR | Status: FUNCTIONAL
Brand: TRUSS

## 2021-11-01 RX ORDER — LIDOCAINE HYDROCHLORIDE 10 MG/ML
0.1 INJECTION, SOLUTION EPIDURAL; INFILTRATION; INTRACAUDAL; PERINEURAL AS NEEDED
Status: DISCONTINUED | OUTPATIENT
Start: 2021-11-01 | End: 2021-11-01 | Stop reason: HOSPADM

## 2021-11-01 RX ORDER — DEXAMETHASONE SODIUM PHOSPHATE 4 MG/ML
INJECTION, SOLUTION INTRA-ARTICULAR; INTRALESIONAL; INTRAMUSCULAR; INTRAVENOUS; SOFT TISSUE AS NEEDED
Status: DISCONTINUED | OUTPATIENT
Start: 2021-11-01 | End: 2021-11-01 | Stop reason: HOSPADM

## 2021-11-01 RX ORDER — SODIUM CHLORIDE 0.9 % (FLUSH) 0.9 %
5-40 SYRINGE (ML) INJECTION EVERY 8 HOURS
Status: DISCONTINUED | OUTPATIENT
Start: 2021-11-01 | End: 2021-11-02

## 2021-11-01 RX ORDER — FAMOTIDINE 20 MG/1
20 TABLET, FILM COATED ORAL 2 TIMES DAILY
Status: DISCONTINUED | OUTPATIENT
Start: 2021-11-01 | End: 2021-11-03

## 2021-11-01 RX ORDER — CYCLOBENZAPRINE HCL 10 MG
10 TABLET ORAL
Status: DISCONTINUED | OUTPATIENT
Start: 2021-11-01 | End: 2021-11-04 | Stop reason: HOSPADM

## 2021-11-01 RX ORDER — AMOXICILLIN 250 MG
1 CAPSULE ORAL 2 TIMES DAILY
Status: DISCONTINUED | OUTPATIENT
Start: 2021-11-01 | End: 2021-11-04 | Stop reason: HOSPADM

## 2021-11-01 RX ORDER — PHENYTOIN SODIUM 100 MG/1
200 CAPSULE, EXTENDED RELEASE ORAL 2 TIMES DAILY
Status: DISCONTINUED | OUTPATIENT
Start: 2021-11-01 | End: 2021-11-04 | Stop reason: HOSPADM

## 2021-11-01 RX ORDER — LIDOCAINE HYDROCHLORIDE 20 MG/ML
INJECTION, SOLUTION EPIDURAL; INFILTRATION; INTRACAUDAL; PERINEURAL AS NEEDED
Status: DISCONTINUED | OUTPATIENT
Start: 2021-11-01 | End: 2021-11-01 | Stop reason: HOSPADM

## 2021-11-01 RX ORDER — MIDAZOLAM HYDROCHLORIDE 1 MG/ML
INJECTION, SOLUTION INTRAMUSCULAR; INTRAVENOUS AS NEEDED
Status: DISCONTINUED | OUTPATIENT
Start: 2021-11-01 | End: 2021-11-01 | Stop reason: HOSPADM

## 2021-11-01 RX ORDER — MORPHINE SULFATE 2 MG/ML
2 INJECTION, SOLUTION INTRAMUSCULAR; INTRAVENOUS
Status: DISCONTINUED | OUTPATIENT
Start: 2021-11-01 | End: 2021-11-01 | Stop reason: HOSPADM

## 2021-11-01 RX ORDER — MELATONIN
5000
Status: DISCONTINUED | OUTPATIENT
Start: 2021-11-01 | End: 2021-11-04 | Stop reason: HOSPADM

## 2021-11-01 RX ORDER — PANTOPRAZOLE SODIUM 40 MG/1
40 TABLET, DELAYED RELEASE ORAL
Status: DISCONTINUED | OUTPATIENT
Start: 2021-11-02 | End: 2021-11-04 | Stop reason: HOSPADM

## 2021-11-01 RX ORDER — SODIUM CHLORIDE 9 MG/ML
125 INJECTION, SOLUTION INTRAVENOUS CONTINUOUS
Status: DISCONTINUED | OUTPATIENT
Start: 2021-11-01 | End: 2021-11-02

## 2021-11-01 RX ORDER — FENTANYL CITRATE 50 UG/ML
25 INJECTION, SOLUTION INTRAMUSCULAR; INTRAVENOUS
Status: DISCONTINUED | OUTPATIENT
Start: 2021-11-01 | End: 2021-11-01 | Stop reason: HOSPADM

## 2021-11-01 RX ORDER — SODIUM CHLORIDE 0.9 % (FLUSH) 0.9 %
5-40 SYRINGE (ML) INJECTION AS NEEDED
Status: DISCONTINUED | OUTPATIENT
Start: 2021-11-01 | End: 2021-11-01 | Stop reason: HOSPADM

## 2021-11-01 RX ORDER — SODIUM CHLORIDE, SODIUM LACTATE, POTASSIUM CHLORIDE, CALCIUM CHLORIDE 600; 310; 30; 20 MG/100ML; MG/100ML; MG/100ML; MG/100ML
25 INJECTION, SOLUTION INTRAVENOUS CONTINUOUS
Status: DISCONTINUED | OUTPATIENT
Start: 2021-11-01 | End: 2021-11-01 | Stop reason: HOSPADM

## 2021-11-01 RX ORDER — ONDANSETRON 2 MG/ML
4 INJECTION INTRAMUSCULAR; INTRAVENOUS AS NEEDED
Status: DISCONTINUED | OUTPATIENT
Start: 2021-11-01 | End: 2021-11-01 | Stop reason: HOSPADM

## 2021-11-01 RX ORDER — ONDANSETRON 2 MG/ML
INJECTION INTRAMUSCULAR; INTRAVENOUS AS NEEDED
Status: DISCONTINUED | OUTPATIENT
Start: 2021-11-01 | End: 2021-11-01 | Stop reason: HOSPADM

## 2021-11-01 RX ORDER — PHENYLEPHRINE HCL IN 0.9% NACL 0.4MG/10ML
SYRINGE (ML) INTRAVENOUS
Status: DISCONTINUED | OUTPATIENT
Start: 2021-11-01 | End: 2021-11-01 | Stop reason: HOSPADM

## 2021-11-01 RX ORDER — FACIAL-BODY WIPES
10 EACH TOPICAL DAILY PRN
Status: DISCONTINUED | OUTPATIENT
Start: 2021-11-03 | End: 2021-11-04 | Stop reason: HOSPADM

## 2021-11-01 RX ORDER — OXYCODONE HYDROCHLORIDE 5 MG/1
5 TABLET ORAL
Status: DISCONTINUED | OUTPATIENT
Start: 2021-11-01 | End: 2021-11-04 | Stop reason: HOSPADM

## 2021-11-01 RX ORDER — HYDROXYZINE HYDROCHLORIDE 10 MG/1
10 TABLET, FILM COATED ORAL
Status: DISCONTINUED | OUTPATIENT
Start: 2021-11-01 | End: 2021-11-04 | Stop reason: HOSPADM

## 2021-11-01 RX ORDER — NALOXONE HYDROCHLORIDE 0.4 MG/ML
0.4 INJECTION, SOLUTION INTRAMUSCULAR; INTRAVENOUS; SUBCUTANEOUS AS NEEDED
Status: DISCONTINUED | OUTPATIENT
Start: 2021-11-01 | End: 2021-11-04 | Stop reason: HOSPADM

## 2021-11-01 RX ORDER — SODIUM CHLORIDE 0.9 % (FLUSH) 0.9 %
5-40 SYRINGE (ML) INJECTION EVERY 8 HOURS
Status: DISCONTINUED | OUTPATIENT
Start: 2021-11-01 | End: 2021-11-01 | Stop reason: HOSPADM

## 2021-11-01 RX ORDER — PROPOFOL 10 MG/ML
INJECTION, EMULSION INTRAVENOUS
Status: DISCONTINUED | OUTPATIENT
Start: 2021-11-01 | End: 2021-11-01 | Stop reason: HOSPADM

## 2021-11-01 RX ORDER — HYDROMORPHONE HYDROCHLORIDE 2 MG/ML
INJECTION, SOLUTION INTRAMUSCULAR; INTRAVENOUS; SUBCUTANEOUS AS NEEDED
Status: DISCONTINUED | OUTPATIENT
Start: 2021-11-01 | End: 2021-11-01 | Stop reason: HOSPADM

## 2021-11-01 RX ORDER — MIDAZOLAM HYDROCHLORIDE 1 MG/ML
0.5 INJECTION, SOLUTION INTRAMUSCULAR; INTRAVENOUS
Status: DISCONTINUED | OUTPATIENT
Start: 2021-11-01 | End: 2021-11-01 | Stop reason: HOSPADM

## 2021-11-01 RX ORDER — OXYCODONE HYDROCHLORIDE 5 MG/1
10-15 TABLET ORAL
Status: DISCONTINUED | OUTPATIENT
Start: 2021-11-01 | End: 2021-11-04 | Stop reason: HOSPADM

## 2021-11-01 RX ORDER — DIPHENHYDRAMINE HYDROCHLORIDE 50 MG/ML
12.5 INJECTION, SOLUTION INTRAMUSCULAR; INTRAVENOUS AS NEEDED
Status: DISCONTINUED | OUTPATIENT
Start: 2021-11-01 | End: 2021-11-01 | Stop reason: HOSPADM

## 2021-11-01 RX ORDER — SUCCINYLCHOLINE CHLORIDE 20 MG/ML
INJECTION INTRAMUSCULAR; INTRAVENOUS AS NEEDED
Status: DISCONTINUED | OUTPATIENT
Start: 2021-11-01 | End: 2021-11-01 | Stop reason: HOSPADM

## 2021-11-01 RX ORDER — POLYETHYLENE GLYCOL 3350 17 G/17G
17 POWDER, FOR SOLUTION ORAL DAILY
Status: DISCONTINUED | OUTPATIENT
Start: 2021-11-02 | End: 2021-11-04 | Stop reason: HOSPADM

## 2021-11-01 RX ORDER — IRBESARTAN 75 MG/1
150 TABLET ORAL
Status: DISCONTINUED | OUTPATIENT
Start: 2021-11-01 | End: 2021-11-04 | Stop reason: HOSPADM

## 2021-11-01 RX ORDER — HYDROMORPHONE HYDROCHLORIDE 1 MG/ML
1 INJECTION, SOLUTION INTRAMUSCULAR; INTRAVENOUS; SUBCUTANEOUS
Status: ACTIVE | OUTPATIENT
Start: 2021-11-01 | End: 2021-11-02

## 2021-11-01 RX ORDER — HYDROMORPHONE HYDROCHLORIDE 1 MG/ML
.2-.5 INJECTION, SOLUTION INTRAMUSCULAR; INTRAVENOUS; SUBCUTANEOUS
Status: DISCONTINUED | OUTPATIENT
Start: 2021-11-01 | End: 2021-11-01 | Stop reason: HOSPADM

## 2021-11-01 RX ORDER — PROPOFOL 10 MG/ML
INJECTION, EMULSION INTRAVENOUS AS NEEDED
Status: DISCONTINUED | OUTPATIENT
Start: 2021-11-01 | End: 2021-11-01 | Stop reason: HOSPADM

## 2021-11-01 RX ORDER — SODIUM CHLORIDE 0.9 % (FLUSH) 0.9 %
5-40 SYRINGE (ML) INJECTION AS NEEDED
Status: DISCONTINUED | OUTPATIENT
Start: 2021-11-01 | End: 2021-11-02

## 2021-11-01 RX ORDER — ACETAMINOPHEN 500 MG
1000 TABLET ORAL EVERY 6 HOURS
Status: DISCONTINUED | OUTPATIENT
Start: 2021-11-01 | End: 2021-11-04 | Stop reason: HOSPADM

## 2021-11-01 RX ORDER — ONDANSETRON 2 MG/ML
4 INJECTION INTRAMUSCULAR; INTRAVENOUS
Status: ACTIVE | OUTPATIENT
Start: 2021-11-01 | End: 2021-11-02

## 2021-11-01 RX ORDER — FENTANYL CITRATE 50 UG/ML
50 INJECTION, SOLUTION INTRAMUSCULAR; INTRAVENOUS AS NEEDED
Status: DISCONTINUED | OUTPATIENT
Start: 2021-11-01 | End: 2021-11-01 | Stop reason: HOSPADM

## 2021-11-01 RX ORDER — ACETAMINOPHEN 500 MG
1000 TABLET ORAL ONCE
Status: COMPLETED | OUTPATIENT
Start: 2021-11-01 | End: 2021-11-01

## 2021-11-01 RX ORDER — THERA TABS 400 MCG
1 TAB ORAL DAILY
Status: DISCONTINUED | OUTPATIENT
Start: 2021-11-01 | End: 2021-11-04 | Stop reason: HOSPADM

## 2021-11-01 RX ORDER — PREGABALIN 75 MG/1
150 CAPSULE ORAL ONCE
Status: COMPLETED | OUTPATIENT
Start: 2021-11-01 | End: 2021-11-01

## 2021-11-01 RX ORDER — FENTANYL CITRATE 50 UG/ML
INJECTION, SOLUTION INTRAMUSCULAR; INTRAVENOUS AS NEEDED
Status: DISCONTINUED | OUTPATIENT
Start: 2021-11-01 | End: 2021-11-01 | Stop reason: HOSPADM

## 2021-11-01 RX ORDER — DIAZEPAM 5 MG/1
5 TABLET ORAL
Status: DISCONTINUED | OUTPATIENT
Start: 2021-11-01 | End: 2021-11-04 | Stop reason: HOSPADM

## 2021-11-01 RX ADMIN — FENTANYL CITRATE 50 MCG: 50 INJECTION, SOLUTION INTRAMUSCULAR; INTRAVENOUS at 16:12

## 2021-11-01 RX ADMIN — PROPOFOL 75 MCG/KG/MIN: 10 INJECTION, EMULSION INTRAVENOUS at 15:49

## 2021-11-01 RX ADMIN — Medication 20 MCG/MIN: at 15:55

## 2021-11-01 RX ADMIN — SODIUM CHLORIDE, POTASSIUM CHLORIDE, SODIUM LACTATE AND CALCIUM CHLORIDE 25 ML/HR: 600; 310; 30; 20 INJECTION, SOLUTION INTRAVENOUS at 15:05

## 2021-11-01 RX ADMIN — IRBESARTAN 150 MG: 75 TABLET ORAL at 22:47

## 2021-11-01 RX ADMIN — PREGABALIN 150 MG: 75 CAPSULE ORAL at 15:26

## 2021-11-01 RX ADMIN — Medication 120 MCG: at 16:29

## 2021-11-01 RX ADMIN — Medication 3 AMPULE: at 15:29

## 2021-11-01 RX ADMIN — LIDOCAINE HYDROCHLORIDE 100 MG: 20 INJECTION, SOLUTION INTRAVENOUS at 15:41

## 2021-11-01 RX ADMIN — DEXAMETHASONE SODIUM PHOSPHATE 4 MG: 4 INJECTION, SOLUTION INTRAMUSCULAR; INTRAVENOUS at 15:54

## 2021-11-01 RX ADMIN — FAMOTIDINE 20 MG: 20 TABLET, FILM COATED ORAL at 19:46

## 2021-11-01 RX ADMIN — ONDANSETRON HYDROCHLORIDE 4 MG: 2 INJECTION, SOLUTION INTRAMUSCULAR; INTRAVENOUS at 16:55

## 2021-11-01 RX ADMIN — DOCUSATE SODIUM 50MG AND SENNOSIDES 8.6MG 1 TABLET: 8.6; 5 TABLET, FILM COATED ORAL at 19:46

## 2021-11-01 RX ADMIN — SUCCINYLCHOLINE CHLORIDE 160 MG: 20 INJECTION, SOLUTION INTRAMUSCULAR; INTRAVENOUS at 15:41

## 2021-11-01 RX ADMIN — Medication 10 ML: at 22:48

## 2021-11-01 RX ADMIN — HYDROMORPHONE HYDROCHLORIDE 0.5 MG: 2 INJECTION, SOLUTION INTRAMUSCULAR; INTRAVENOUS; SUBCUTANEOUS at 16:40

## 2021-11-01 RX ADMIN — Medication 5000 UNITS: at 22:47

## 2021-11-01 RX ADMIN — Medication 1000 MG: at 15:26

## 2021-11-01 RX ADMIN — DIAZEPAM 5 MG: 5 TABLET ORAL at 18:01

## 2021-11-01 RX ADMIN — CEFAZOLIN SODIUM 2 G: 1 INJECTION, POWDER, FOR SOLUTION INTRAMUSCULAR; INTRAVENOUS at 23:03

## 2021-11-01 RX ADMIN — PHENYTOIN SODIUM 200 MG: 100 CAPSULE ORAL at 19:46

## 2021-11-01 RX ADMIN — PROPOFOL 150 MG: 10 INJECTION, EMULSION INTRAVENOUS at 15:41

## 2021-11-01 RX ADMIN — MIDAZOLAM HYDROCHLORIDE 1 MG: 1 INJECTION, SOLUTION INTRAMUSCULAR; INTRAVENOUS at 15:38

## 2021-11-01 RX ADMIN — OXYCODONE 10 MG: 5 TABLET ORAL at 18:01

## 2021-11-01 RX ADMIN — Medication 1 AMPULE: at 22:48

## 2021-11-01 RX ADMIN — WATER 2 G: 1 INJECTION INTRAMUSCULAR; INTRAVENOUS; SUBCUTANEOUS at 15:52

## 2021-11-01 RX ADMIN — THERA TABS 1 TABLET: TAB at 19:47

## 2021-11-01 RX ADMIN — ACETAMINOPHEN 1000 MG: 500 TABLET ORAL at 23:42

## 2021-11-01 RX ADMIN — FENTANYL CITRATE 50 MCG: 50 INJECTION, SOLUTION INTRAMUSCULAR; INTRAVENOUS at 15:41

## 2021-11-01 RX ADMIN — ACETAMINOPHEN 1000 MG: 500 TABLET ORAL at 19:47

## 2021-11-01 NOTE — ANESTHESIA POSTPROCEDURE EVALUATION
Procedure(s):  L4-5 LATERAL FUSION FROM LEFT SIDED APPROACH. general    Anesthesia Post Evaluation      Multimodal analgesia: multimodal analgesia used between 6 hours prior to anesthesia start to PACU discharge  Patient location during evaluation: PACU  Level of consciousness: sleepy but conscious  Pain management: adequate  Airway patency: patent  Anesthetic complications: no  Cardiovascular status: acceptable  Respiratory status: acceptable  Hydration status: acceptable  Comments: +Post-Anesthesia Evaluation and Assessment    Patient: Wild Guthrie MRN: [id number]  SSN: [id number]   YOB: 1945  Age: 66 y.o. Sex: male      Cardiovascular Function/Vital Signs    /74   Pulse 76   Temp 36.8 °C (98.2 °F)   Resp 12   Ht 6' 1.5\" (1.867 m)   Wt 104.1 kg (229 lb 8 oz)   SpO2 100%   BMI 29.87 kg/m²     Patient is status post Procedure(s):  L4-5 LATERAL FUSION FROM LEFT SIDED APPROACH. Nausea/Vomiting: Controlled. Postoperative hydration reviewed and adequate. Pain:  Pain Scale 1: Numeric (0 - 10) (11/01/21 1803)  Pain Intensity 1: 8 (11/01/21 1803)   Managed. Neurological Status:   Neuro (WDL): Within Defined Limits (11/01/21 1727)   At baseline. Mental Status and Level of Consciousness: Arousable. Pulmonary Status:   O2 Device: Nasal cannula (11/01/21 1727)   Adequate oxygenation and airway patent. Complications related to anesthesia: None    Post-anesthesia assessment completed. No concerns. Signed By: Sho Weston MD    11/1/2021  Post anesthesia nausea and vomiting:  controlled  Final Post Anesthesia Temperature Assessment:  Normothermia (36.0-37.5 degrees C)      INITIAL Post-op Vital signs:   Vitals Value Taken Time   /74 11/01/21 1800   Temp 36.8 °C (98.2 °F) 11/01/21 1727   Pulse 73 11/01/21 1802   Resp 15 11/01/21 1802   SpO2 100 % 11/01/21 1802   Vitals shown include unvalidated device data.

## 2021-11-01 NOTE — H&P
Progress Notes  Lucretia Luo) [facility] Orthopedic Surgery  Cosigned by: Merly López MD at 9/23/2021 10:13 AM   Expand All Collapse All  ASSESSMENT:  No diagnosis found.         Patient Active Problem List   Diagnosis    Spinal stenosis of lumbar region without neurogenic claudication    Bilateral sciatica         Impression: L4-L5 spondylolisthesis with severe stenosis; BLE weakness. Gait and balance difficulties      PLAN:  The patient verbalized understanding of our findings and treatment plan. We engaged in the shared decision-making process and treatment options, along with risks and benefits, were discussed at length with the patient.     I reviewed the exam findings with Mr. Pauline Kurtz today. He presents with bilateral 2/5 EHL and 3/5 gastroc/soleus weakness. His EMG is positive, showing L5 radiculopathy on the right and bilateral S1. We discussed his weakness will become permanent over time if he opts not to proceed with any surgical intervention. There is no guarantee that his weakness will completely resolve but an operation will prevent it from progressing. He is a candidate for an L4-L5 posterior/lateral decompression and fusion staged with a left sided lateral approach. We discussed the operation in detail and answered his questions. He is agreeable and would like to proceed.  We need cardiac clearance before we can proceed.      I have discussed the procedure in detail with the patient and mentioned complications, including but not limited to: death, permanent disability, heart attack, stroke, lung injury or infection, blindness, ileus, bladder or bowel problems, ureter injury, bleeding, nerve injury (including numbness, pain and weakness), paralysis (which may be permanent), failure to heal, failure to fuse bone together in fusion procedures, failure to relief symptoms, failure to relief pain, increased pain, need for further surgeries, failure or breakage or hardware, malpositioning of hardware, need to fuse or operate on additional levels determined either during or after surgery, destabilization of the spine (which may require fusion or later surgery), infections (which may or may not require additional surgery), dural tears (tears of the sac holding in nerves and spinal fluid), meningitis, voice changes, blood clots, pulmonary embolus, recurrent disc herniation, diaphragm paralysis, and anesthetic complications. Comorbidities such as obesity, smoking, rheumatoid arthritis, chronic steroid use and diabetes increase these risks. The patient understands and wants to proceed.      The patient has been prescribed a LSO spinal orthosis pre-operatively for pain relief. The orthosis is medically necessary to reduce pain by restricting mobility of the trunk and to otherwise support weak spinal muscles and/or deformed spine. The patient will meet with our bracing coordinator to be fit for the brace. Follow up after surgery.            Treatment Plan:       Orders Placed This Encounter    BMI >=25 PATIENT INSTRUCTIONS & EDUCATION         Follow-up: No follow-ups on file.         HISTORY OF PRESENT ILLNESS:  Jose Mcgowan; [id number]   Age: 66 y.o. Sex: male   Pain score: Pain rating =   3 out of 10      Chief Complaint: Pain of the Lower Back        History of present illness:  Jose Mcgowan is a 66 y.o. male with complaints of fatigue in the bilateral thighs. His symptoms have been present chronically for over 1 year. He notes his symptoms are worse with walking for prolonged periods. He reports he has to focus on walking more than he had to previously and admits to balance difficulties. He presents today to discuss the results of his lumbar spine MRI and EMG. Jose Mcgowan has tried an HEP. History of PT. The pain is rated 3 out of 10 on the VAS.              Patient Active Problem List     Diagnosis Date Noted    Spinal stenosis of lumbar region without neurogenic claudication 08/22/2019    Bilateral sciatica 08/22/2019               Family History   Problem Relation Age of Onset    No Known Problems Mother      No Known Problems Father      No Known Problems Brother      No Known Problems Sister      No Known Problems Son      No Known Problems Daughter      No Known Problems Other      Diabetes Neg Hx      Coronary artery disease Neg Hx      Clotting disorder Neg Hx      Anesthesia problems Neg Hx           Social History            Tobacco Use    Smoking status: Never Smoker    Smokeless tobacco: Never Used   Substance Use Topics    Alcohol use: Yes       Comment: Social Consumption Only         Medical History        Past Medical History:   Diagnosis Date    GERD (gastroesophageal reflux disease)      Seizures              Surgical History         Past Surgical History:   Procedure Laterality Date    FOOT SURGERY        KNEE SURGERY        NO RELEVANT SURGERIES        SHOULDER SURGERY                   Current Outpatient Medications:     Calcium Polycarbophil (FIBER-CAPS PO), Take 2 capsules by mouth daily  , Disp: , Rfl:     Cholecalciferol (VITAMIN D) 1000 units tablet, Take 5,000 Units by mouth daily, Disp: , Rfl:     irbesartan (AVAPRO) 150 MG tablet, Take 150 mg by mouth daily  , Disp: , Rfl:     meloxicam (MOBIC) 15 MG tablet, Take 15 mg by mouth daily  , Disp: , Rfl:     Multiple Vitamins-Minerals (CENTRUM SILVER 50+MEN PO), Take 2 tablets by mouth daily, Disp: , Rfl:     omeprazole (PriLOSEC) 10 MG capsule, Take 20 mg by mouth daily  , Disp: , Rfl:     phenytoin (DILANTIN) 100 MG ER capsule, Take 100 mg by mouth 4 (four) times a day  , Disp: , Rfl:      No Known Allergies      ROS:   No new bowel or bladder incontinence. No fever. No saddle anesthesia.     OBJECTIVE:  There were no vitals filed for this visit.     Body mass index is 30.08 kg/m². , a BMI over 30 is considered obese and a BMI over 40 has been associated with a higher risk of surgical complications.     Constitutional: No acute distress. Well nourished. Respiratory:  No labored breathing. Cardiovascular:  No marked cyanosis. Skin:  No marked skin ulcers/lesions on bilateral upper or lower extremities. Psychiatric: Alert and oriented x3. Inspection: No gross deformity of bilateral upper or lower extremities. Musculoskeletal/Neurological:   Gait/balance:  - Unsteady   Thoracolumbar spine:  - No tenderness to palpation  - Full range of motion. Right lower extremity:  - No tenderness to palpation   - Full range of motion  - No pain with internal/external rotation of the hip  - Strength:  - 5 out of 5 to hip flexors  - 5 out of 5 to quads  - 5 out of 5 to TA  - 2 out of 5 to EHL  - 3 out of 5 to Gastroc/Soleus  - Negative straight leg raise  Left lower extremity:  - No tenderness to palpation   - Full range of motion  - No pain with internal/external rotation of the hip  - Strength:  - 5 out of 5 to hip flexors  - 5 out of 5 to quads  - 5 out of 5 to TA  - 2 out of 5 to EHL  - 3 out of 5 to Gastroc/Soleus  - Negative straight leg raise  Sensation:  - Intact to light touch  Reflexes:  - +2 Patellar tendon   - +2 Achilles tendon            RESULTS REVIEWED:          No imaging obtained       I personally and independently reviewed the lumbar spine MRI done at [facility] in August 2021 that shows severe stenosis L4-L5.with a spondylolisthesis.       I personally and independently reviewed the EMG done with Dr. Brady Copeland in September 2021 that shows radiculopathy L5 on the right and bilateral S1.      I have instructed the patient to continue to work on their physician supervised home exercise program.      This note has been transcribed electronically using voice recognition and a trained scribe.   It is believed to be accurate, but may contain errors secondary to technological limitations and other factors.     I, Marie Oneill MD, personally, performed the services described in this documentation, as scribed in my presence, and it is both accurate and complete.   Scribed by: Sergio Fischer

## 2021-11-01 NOTE — ANESTHESIA PREPROCEDURE EVALUATION
Relevant Problems   No relevant active problems       Anesthetic History   No history of anesthetic complications            Review of Systems / Medical History  Patient summary reviewed, nursing notes reviewed and pertinent labs reviewed    Pulmonary                   Neuro/Psych     seizures        Comments: LUMBOSACRAL RADICULOPATHY, LUMBAR SPINE PAIN, BILATERAL SCIATICA, SPONDYLOLISTHESIS, LUMBAR REGION, SPINAL STENOSIS LUMBAR REGION, WITH NEUROGENIC CLAUDICATION, FORAMINAL STENOSIS OF LUMBAR REGION, WEAKNESS OF BOTH LOWER EXTREMITIES Cardiovascular    Hypertension              Exercise tolerance: >4 METS     GI/Hepatic/Renal     GERD           Endo/Other        Obesity and arthritis     Other Findings   Comments: Hx skin ca           Physical Exam    Airway  Mallampati: I    Neck ROM: normal range of motion   Mouth opening: Normal     Cardiovascular  Regular rate and rhythm,  S1 and S2 normal,  no murmur, click, rub, or gallop             Dental  No notable dental hx       Pulmonary  Breath sounds clear to auscultation               Abdominal  GI exam deferred       Other Findings            Anesthetic Plan    ASA: 2  Anesthesia type: general    Monitoring Plan: BIS      Induction: Intravenous  Anesthetic plan and risks discussed with: Patient

## 2021-11-01 NOTE — PROGRESS NOTES
TRANSFER - IN REPORT:    Verbal report received from Aixa(name) on [employer]  being received from PACU(unit) for routine post - op      Report consisted of patients Situation, Background, Assessment and   Recommendations(SBAR). Information from the following report(s) SBAR and Kardex was reviewed with the receiving nurse. Opportunity for questions and clarification was provided. Assessment completed upon patients arrival to unit and care assumed.

## 2021-11-01 NOTE — BRIEF OP NOTE
Brief Postoperative Note    Patient: Kerrie Harrell  YOB: 1945  MRN: [id number]    Date of Procedure: 11/1/2021     Pre-Op Diagnosis: LUMBOSACRAL RADICULOPATHY, LUMBAR SPINE PAIN, BILATERAL SCIATICA, SPONDYLOLISTHESIS, LUMBAR REGION, SPINAL STENOSIS LUMBAR REGION, WITH NEUROGENIC CLAUDICATION, FORAMINAL STENOSIS OF LUMBAR REGION, WEAKNESS OF BOTH LOWER EXTREMITIES    Post-Op Diagnosis: Same as preoperative diagnosis. Procedure(s):  L4-5 LATERAL FUSION FROM LEFT SIDED APPROACH    Surgeon(s):  Marisa Griggs MD    Surgical Assistant: Physician Assistant: TYESHA Diaz    Anesthesia: Other     Estimated Blood Loss (mL): less than 50     Complications: None    Specimens: * No specimens in log *     Implants:   Implant Name Type Inv.  Item Serial No.  Lot No. LRB No. Used Action   GRAFT BONE 5 CC - [id number]  GRAFT BONE 5 CC [id number] [employer]_WD NA Left 1 Implanted   SPACER SPNL [id number] 10DEG LAT LUM INTBDY FUS TI - SNA  SPACER SPNL [id number] 10DEG LAT LUM INTBDY FUS TI NA [employer]WD NA Left 1 Implanted   PLATE SPNL [id number] THORLUM L4-L5 LAT STBL [place] - SNA  PLATE SPNL [id number] THORLUM L4-L5 LAT STBL [place] NA [employer]WD NA Left 1 Implanted   SCREW SPNL L30MM OD5.5MM STEPHANE ANG TRUSS - SNA  SCREW SPNL L30MM OD5.5MM STEPHANE ANG [place] NA [employer]WD NA Left 1 Implanted       Drains: * No LDAs found *    Findings: Stenosis    Electronically Signed by Launie Klinefelter, MD on 11/1/2021 at 4:58 PM

## 2021-11-01 NOTE — OP NOTES
[facility]  OPERATIVE REPORT    Name:  Lisa Tsang  MR#:  [id number]  :  1945  DATE OF SERVICE:  2021    PREOPERATIVE DIAGNOSES:  1. Bilateral sciatica    2. Spondylolisthesis, lumbar region    3. Spinal stenosis, lumbar region, with neurogenic claudication    4. Foraminal stenosis of lumbar region    5. Weakness of both lower extremities    6. Lumbar spine pain    7. Lumbosacral radiculopathy        POSTOPERATIVE DIAGNOSES:  1. Bilateral sciatica    2. Spondylolisthesis, lumbar region    3. Spinal stenosis, lumbar region, with neurogenic claudication    4. Foraminal stenosis of lumbar region    5. Weakness of both lower extremities    6. Lumbar spine pain    7. Lumbosacral radiculopathy        PROCEDURES PERFORMED:  1. L4-5 anterior arthrodesis. 2. L4-5 anterior instrumentation. 3. L4-5 insertion of Interbody biomechanical device. 4. Bone marrow aspirate through separate fascial incision for spinal fusion augmentation. 5. Use of allograft bone for spinal fusion. SURGEON:  Jamila Nix MD    FIRST ASSISTANT:  TYESHA Rivera  Mr. Marco Cruz has undergone a major surgery. Mireya Rivera knowledge about the pertinent anatomy, procedural steps and equipment requirent in this procedure was medically necessary to ensure the safety of the patient. His assistance has helped reduce surgical time by 35%. Surgical tasks included, but are not limited to:  1) Safe and proper retraction. 2) Maintenance of visualization during surgery by helping with tasks such as suctioning. 3) Multiple tasks throughout the decompression and instrumentation to allow for timely and safe completion of the procedure. 4) Overall assistance helped decrease the risk complications such as infection, bleeding, hardware malposition, and damage to surrounding structures. ANESTHESIA:  GETA. COMPLICATIONS:  None. SPECIMENS:  None.     IMPLANTS:    1. 1024 [place] anterior lumbar plate. 2. Globus RISE-L interbody biomechanical device. Please note that the anterior lumbar plate and the Interbody biomechanical device are completely separate and independent devices that function autonomously from each other. ESTIMATED BLOOD LOSS:  50 mL. DRAINS:  None. PRE-OP ANTIBIOTICS: Ancef. INDICATIONS FOR PROCEDURE:    Taco Maurice is a 66 y.o. male who has the above listed diagnosis. Mr. Rosalia Blount has failed to improve with non-operative treatment and has chosen to proceed with surgical intervention. We had a long conversation about pros and cons of surgery, risks, possible complications, alternative treatments, and Taco Maurice had an opportunity to ask questions and is satisfied with my answers and explanations. I have personally given warnings about possible complications including but not limited to death, permanent disability, heart attack, stroke, lung injury or infection, blindness, ileus, bladder or bowel problems, ureter injury, bleeding, blood vessel injury, nerve injury (including numbness, pain and weakness), paralysis (which may be permanent), failure to heal, failure to fuse bone together in fusion procedures, failure to relief symptoms, failure to relief pain, increased pain, need for further surgeries, failure or breakage or hardware, malpositioning of hardware, need to fuse or operate on additional levels determined either during or after surgery, destabilization of the spine (which may require fusion or later surgery), infections (which may or may not require additional surgery), dural tears (tears of the sac holding in nerves and spinal fluid), meningitis, blood clots, pulmonary embolus, recurrent disc herniation, and anesthetic complications. Comorbidities such as obesity, smoking, rheumatoid arthritis, chronic steroid use and diabetes increase these risks. .  Mr. Rosalia Blount  understands and wants to proceed.     NARRATIVE OF THE PROCEDURE:    After informed consent was obtained and Lisa Willis had a chance to ask any last minute questions, the patient was transported to the operating room and underwent general endotracheal anesthesia. Neuromonitoring placed their leads and obtained baseline signals. Lisa Willis was positioned on the lateral decubitus on the [facility] table and the body was properly padded. An axillary roll was place and Mr. Krista Mckenna was properly secured to the table. Fluoroscopy was used to linda the level of the incision and the site was prepped and draped in the usual manner. A time out was obtained and we verified that we had the correct patient the correct site and that the proper IV antibiotics had been administered in accordance with SCIP protocol. A standard anterior approach for a L4-5 OLIF was performed. The abdominal musculature was split in line with its fibers and the retroperitoneal space was entered. The peritoneal contents were retracted anteriorly and a moist lap sponge was used for protection/retraction. The Psoas muscle was exposed and retracted posteriorly. The L4-5 disc was exposed and fluoroscopy verified the correct level. A Jamshidi needle was inserted through a separate fascial incision into the ASIS and 20 mL of bone marrow were aspirated. The aspirate was used to augment all the bone graft used in this surgical procedure. The self retaining retractor was placed at the L4-5 level and a box annulotomy was performed with a #15 blade on the L4-5 disc. The cartilaginous end plates were detached with a Gamble elevator. A box shaver was used to complete the discectomy. A trial was used to determine the size of the interbody biomechanical device. While the interbody biomechanical device was being packed with allograft bone soaked in bone marrow aspirate, the discectomy was completed with a pituitary and a curette. The interbody biomechanical device was inserted at the L4-5 level.   Once in the proper position it was deployed to its final height. A funnel was used to backfill the interbody biomechanical device with allograft bone soaked in bone marrow aspirate to complete the anterior fusion at the L4-5 level. A completely separate and independent plate was placed at the L4-5 level for the anterior instrumentation (please note that this plate functions independently from the interbody biomechanical device). The awl was used to create a  hole and the appropriate screws were used to secure the plate to the U0-0 level completing the anterior instrumentation. Once the procedure was completed, final AP and lateral fluoroscopic images were obtained and saved to PACS. The abdominal musculature was re-approximated with 2-0 vicryl, the subcutaneous layer was closed with 3-0 vicryl, the skin was closed with a 3-0 stratafix and dermabond. Once the procedure was finished the patient was awoken and transferred to PACU in stable condition. POSTOPERATIVE PLAN:  The patient will have SCDs for DVT prophylaxis and IV antibiotics for infection prophylaxis. COUNTS: Sponge and needle counts were correct.     SIGNED BY:  Jamila Nix MD     November 1, 2021

## 2021-11-01 NOTE — PERIOP NOTES
TRANSFER - OUT REPORT:    Verbal report given to RN on Anselm Fuse  being transferred to  for routine post - op       Report consisted of patients Situation, Background, Assessment and   Recommendations(SBAR). Information from the following report(s) SBAR was reviewed with the receiving nurse. Opportunity for questions and clarification was provided.       Patient transported with:   O2 @ 2 liters  Registered Nurse

## 2021-11-01 NOTE — DISCHARGE INSTRUCTIONS
[facility]    Discharge Instruction Sheet: POSTERIOR SPINAL FUSION    DR. Berta Trammell    Pain control:   Typically, we will prescribe a narcotic usually 1-2 tabs every four hours is    sufficient for the pain. Most patients need this only for the first few weeks. You   should discontinue this as the pain decreases. You should not drive while taking any narcotic pain medications. Constipation  Pain medicines and anesthesia can be constipating-this can be prevented by gentle physical activity and drinking plenty of fluid. It should be treated with over-the-counter medications such as Miralax or suppositories, and/or Fleets enema. You should have a bowel movement at least every other day following surgery. Incision care     Keep this area clean and dry. Your dressing is designed to stay in place for 5-7 days. You will be sent home with one additional dressing to change at that time. Leave this new dressing in place until our follow up visit in the office in about 10-14 days. If staples are in place, they should be removed about 14-20 days after surgery. You may shower with this impermeable dressing in place. DO NOT take a tub bath or go swimming until cleared by your doctor. DO NOT apply lotions, oils, or creams to incision. To increase and promote healing:   Stop Smoking (or at least cut back on smoking).  Eat a well-balanced diet (high in protein and vitamin C)   If your appetite is poor, consider nutritional supplements like Ensure, Glucerna, or Rhodelia Instant Breakfast.   If you are diabetic, controlling you blood sugars is very important to prevent infection and promote wound healing. Nutrition:   If you were on a supplement such as Ensure or Glucerna) while in the hospital, please continue using them with each meal for the next 30 days.    Eat a well-balanced diet - High in protein, high in vitamins and minerals, especially vitamin C and zinc.     Restrictions:   Remember your \"BLT's\"    1. Limited bending at waist    2. Lift no more than 10 pounds    3. No Twisting     If you were given a brace, wear it when out of bed. Warning signs : Please call your physician immediately at [phone] if you have   Bleeding from incision that is constant.  Change in mental status (unusual behavior or confusion)   If your incision develops redness or swelling   Change in wound drainage (increase in amount, color, or foul odor)   [place] over 101.5 degrees Fahrenheit    Headache that is not relieved with pain medication   Tenderness or redness in the calf of your leg    Emergency: CALL 911 if you have   Shortness of breath   Chest pain   Localized chest pain when coughing or taking a deep breath    Follow-up  Please call Dr. Arie Dawson office for a follow up appointment in 2-3 weeks at [phone]. You can return to work when cleared by a physician. During normal business hours you may reach Dr. Mary Garcia' team directly at [phone] if you have concerns or questions.     Barbara Orantes

## 2021-11-02 ENCOUNTER — ANESTHESIA EVENT (OUTPATIENT)
Dept: SURGERY | Age: 76
DRG: 455 | End: 2021-11-02
Payer: MEDICARE

## 2021-11-02 ENCOUNTER — ANESTHESIA (OUTPATIENT)
Dept: SURGERY | Age: 76
DRG: 455 | End: 2021-11-02
Payer: MEDICARE

## 2021-11-02 ENCOUNTER — APPOINTMENT (OUTPATIENT)
Dept: GENERAL RADIOLOGY | Age: 76
DRG: 455 | End: 2021-11-02
Attending: ORTHOPAEDIC SURGERY
Payer: MEDICARE

## 2021-11-02 PROBLEM — M48.00 SPINAL STENOSIS: Status: ACTIVE | Noted: 2021-11-02

## 2021-11-02 LAB — HGB BLD-MCNC: 11.2 G/DL (ref 12.1–17)

## 2021-11-02 PROCEDURE — 97161 PT EVAL LOW COMPLEX 20 MIN: CPT

## 2021-11-02 PROCEDURE — C1713 ANCHOR/SCREW BN/BN,TIS/BN: HCPCS | Performed by: ORTHOPAEDIC SURGERY

## 2021-11-02 PROCEDURE — 74011000250 HC RX REV CODE- 250: Performed by: ORTHOPAEDIC SURGERY

## 2021-11-02 PROCEDURE — 76010000161 HC OR TIME 1 TO 1.5 HR INTENSV-TIER 1: Performed by: ORTHOPAEDIC SURGERY

## 2021-11-02 PROCEDURE — 74011250636 HC RX REV CODE- 250/636: Performed by: NURSE PRACTITIONER

## 2021-11-02 PROCEDURE — 74011250637 HC RX REV CODE- 250/637: Performed by: ANESTHESIOLOGY

## 2021-11-02 PROCEDURE — 74011250636 HC RX REV CODE- 250/636: Performed by: NURSE ANESTHETIST, CERTIFIED REGISTERED

## 2021-11-02 PROCEDURE — 77030008462 HC STPLR SKN PROX J&J -A: Performed by: ORTHOPAEDIC SURGERY

## 2021-11-02 PROCEDURE — 97535 SELF CARE MNGMENT TRAINING: CPT

## 2021-11-02 PROCEDURE — 76000 FLUOROSCOPY <1 HR PHYS/QHP: CPT

## 2021-11-02 PROCEDURE — 74011250637 HC RX REV CODE- 250/637: Performed by: PHYSICIAN ASSISTANT

## 2021-11-02 PROCEDURE — 77030003028 HC SUT VCRL J&J -A: Performed by: ORTHOPAEDIC SURGERY

## 2021-11-02 PROCEDURE — 77030032806 HC CAP SPN LOK CREO GLBM -B: Performed by: ORTHOPAEDIC SURGERY

## 2021-11-02 PROCEDURE — 94760 N-INVAS EAR/PLS OXIMETRY 1: CPT

## 2021-11-02 PROCEDURE — 8E0W0CZ ROBOTIC ASSISTED PROCEDURE OF TRUNK REGION, OPEN APPROACH: ICD-10-PCS | Performed by: ORTHOPAEDIC SURGERY

## 2021-11-02 PROCEDURE — C1889 IMPLANT/INSERT DEVICE, NOC: HCPCS | Performed by: ORTHOPAEDIC SURGERY

## 2021-11-02 PROCEDURE — 97530 THERAPEUTIC ACTIVITIES: CPT

## 2021-11-02 PROCEDURE — 97165 OT EVAL LOW COMPLEX 30 MIN: CPT

## 2021-11-02 PROCEDURE — 65270000029 HC RM PRIVATE

## 2021-11-02 PROCEDURE — 77030013079 HC BLNKT BAIR HGGR 3M -A: Performed by: ANESTHESIOLOGY

## 2021-11-02 PROCEDURE — 77010033678 HC OXYGEN DAILY

## 2021-11-02 PROCEDURE — 74011000250 HC RX REV CODE- 250: Performed by: NURSE ANESTHETIST, CERTIFIED REGISTERED

## 2021-11-02 PROCEDURE — 77030014647 HC SEAL FBRN TISSL BAXT -D: Performed by: ORTHOPAEDIC SURGERY

## 2021-11-02 PROCEDURE — G0378 HOSPITAL OBSERVATION PER HR: HCPCS

## 2021-11-02 PROCEDURE — 76210000016 HC OR PH I REC 1 TO 1.5 HR: Performed by: ORTHOPAEDIC SURGERY

## 2021-11-02 PROCEDURE — 97116 GAIT TRAINING THERAPY: CPT

## 2021-11-02 PROCEDURE — 77030026438 HC STYL ET INTUB CARD -A: Performed by: ANESTHESIOLOGY

## 2021-11-02 PROCEDURE — 74011250636 HC RX REV CODE- 250/636: Performed by: ORTHOPAEDIC SURGERY

## 2021-11-02 PROCEDURE — 72100 X-RAY EXAM L-S SPINE 2/3 VWS: CPT

## 2021-11-02 PROCEDURE — 99218 HC RM OBSERVATION: CPT

## 2021-11-02 PROCEDURE — 36415 COLL VENOUS BLD VENIPUNCTURE: CPT

## 2021-11-02 PROCEDURE — 96376 TX/PRO/DX INJ SAME DRUG ADON: CPT

## 2021-11-02 PROCEDURE — 2709999900 HC NON-CHARGEABLE SUPPLY: Performed by: ORTHOPAEDIC SURGERY

## 2021-11-02 PROCEDURE — 77030003666 HC NDL SPINAL BD -A: Performed by: ORTHOPAEDIC SURGERY

## 2021-11-02 PROCEDURE — 07DR3ZZ EXTRACTION OF ILIAC BONE MARROW, PERCUTANEOUS APPROACH: ICD-10-PCS | Performed by: ORTHOPAEDIC SURGERY

## 2021-11-02 PROCEDURE — 77030008684 HC TU ET CUF COVD -B: Performed by: ANESTHESIOLOGY

## 2021-11-02 PROCEDURE — 74011250637 HC RX REV CODE- 250/637: Performed by: ORTHOPAEDIC SURGERY

## 2021-11-02 PROCEDURE — 74011250636 HC RX REV CODE- 250/636: Performed by: ANESTHESIOLOGY

## 2021-11-02 PROCEDURE — 77030022704 HC SUT VLOC COVD -B: Performed by: ORTHOPAEDIC SURGERY

## 2021-11-02 PROCEDURE — 85018 HEMOGLOBIN: CPT

## 2021-11-02 PROCEDURE — 76060000033 HC ANESTHESIA 1 TO 1.5 HR: Performed by: ORTHOPAEDIC SURGERY

## 2021-11-02 PROCEDURE — 77030020268 HC MISC GENERAL SUPPLY: Performed by: ORTHOPAEDIC SURGERY

## 2021-11-02 PROCEDURE — 77030003445 HC NDL BIOP BN BD -B: Performed by: ORTHOPAEDIC SURGERY

## 2021-11-02 PROCEDURE — 77030018723 HC ELCTRD BLD COVD -A: Performed by: ORTHOPAEDIC SURGERY

## 2021-11-02 PROCEDURE — 0SG0071 FUSION OF LUMBAR VERTEBRAL JOINT WITH AUTOLOGOUS TISSUE SUBSTITUTE, POSTERIOR APPROACH, POSTERIOR COLUMN, OPEN APPROACH: ICD-10-PCS | Performed by: ORTHOPAEDIC SURGERY

## 2021-11-02 DEVICE — CREO MIS MODULAR POLYAXIAL TULIP, 30MM REDUCTION
Type: IMPLANTABLE DEVICE | Site: SPINE LUMBAR | Status: FUNCTIONAL
Brand: CREO

## 2021-11-02 DEVICE — CREO MIS 5.5MM CURVED ROD, TITANIUM ALLOY, 60MM LENGTH
Type: IMPLANTABLE DEVICE | Site: SPINE LUMBAR | Status: FUNCTIONAL
Brand: CREO

## 2021-11-02 DEVICE — 7.5 X 45MM CANNULATED SCREW, MODULAR, CREO AMP
Type: IMPLANTABLE DEVICE | Site: SPINE LUMBAR | Status: FUNCTIONAL
Brand: CREO

## 2021-11-02 DEVICE — CREO MIS LOCKING CAP
Type: IMPLANTABLE DEVICE | Site: SPINE LUMBAR | Status: FUNCTIONAL
Brand: CREO

## 2021-11-02 DEVICE — IMPLANTABLE DEVICE: Type: IMPLANTABLE DEVICE | Site: SPINE LUMBAR | Status: FUNCTIONAL

## 2021-11-02 RX ORDER — DEXAMETHASONE SODIUM PHOSPHATE 4 MG/ML
INJECTION, SOLUTION INTRA-ARTICULAR; INTRALESIONAL; INTRAMUSCULAR; INTRAVENOUS; SOFT TISSUE AS NEEDED
Status: DISCONTINUED | OUTPATIENT
Start: 2021-11-02 | End: 2021-11-02 | Stop reason: HOSPADM

## 2021-11-02 RX ORDER — PHENYLEPHRINE HCL IN 0.9% NACL 0.4MG/10ML
SYRINGE (ML) INTRAVENOUS AS NEEDED
Status: DISCONTINUED | OUTPATIENT
Start: 2021-11-02 | End: 2021-11-02 | Stop reason: HOSPADM

## 2021-11-02 RX ORDER — CEFAZOLIN SODIUM 1 G/3ML
INJECTION, POWDER, FOR SOLUTION INTRAMUSCULAR; INTRAVENOUS AS NEEDED
Status: DISCONTINUED | OUTPATIENT
Start: 2021-11-02 | End: 2021-11-02 | Stop reason: HOSPADM

## 2021-11-02 RX ORDER — MIDAZOLAM HYDROCHLORIDE 1 MG/ML
1 INJECTION, SOLUTION INTRAMUSCULAR; INTRAVENOUS AS NEEDED
Status: DISCONTINUED | OUTPATIENT
Start: 2021-11-02 | End: 2021-11-02 | Stop reason: HOSPADM

## 2021-11-02 RX ORDER — LIDOCAINE HYDROCHLORIDE 10 MG/ML
0.1 INJECTION, SOLUTION EPIDURAL; INFILTRATION; INTRACAUDAL; PERINEURAL AS NEEDED
Status: DISCONTINUED | OUTPATIENT
Start: 2021-11-02 | End: 2021-11-02 | Stop reason: HOSPADM

## 2021-11-02 RX ORDER — HYDROMORPHONE HYDROCHLORIDE 1 MG/ML
.2-.5 INJECTION, SOLUTION INTRAMUSCULAR; INTRAVENOUS; SUBCUTANEOUS
Status: DISCONTINUED | OUTPATIENT
Start: 2021-11-02 | End: 2021-11-02 | Stop reason: HOSPADM

## 2021-11-02 RX ORDER — ROCURONIUM BROMIDE 10 MG/ML
INJECTION, SOLUTION INTRAVENOUS AS NEEDED
Status: DISCONTINUED | OUTPATIENT
Start: 2021-11-02 | End: 2021-11-02 | Stop reason: HOSPADM

## 2021-11-02 RX ORDER — LIDOCAINE HYDROCHLORIDE 20 MG/ML
INJECTION, SOLUTION EPIDURAL; INFILTRATION; INTRACAUDAL; PERINEURAL AS NEEDED
Status: DISCONTINUED | OUTPATIENT
Start: 2021-11-02 | End: 2021-11-02 | Stop reason: HOSPADM

## 2021-11-02 RX ORDER — NEOSTIGMINE METHYLSULFATE 1 MG/ML
INJECTION, SOLUTION INTRAVENOUS AS NEEDED
Status: DISCONTINUED | OUTPATIENT
Start: 2021-11-02 | End: 2021-11-02 | Stop reason: HOSPADM

## 2021-11-02 RX ORDER — ONDANSETRON 2 MG/ML
INJECTION INTRAMUSCULAR; INTRAVENOUS AS NEEDED
Status: DISCONTINUED | OUTPATIENT
Start: 2021-11-02 | End: 2021-11-02 | Stop reason: HOSPADM

## 2021-11-02 RX ORDER — SODIUM CHLORIDE, SODIUM LACTATE, POTASSIUM CHLORIDE, CALCIUM CHLORIDE 600; 310; 30; 20 MG/100ML; MG/100ML; MG/100ML; MG/100ML
25 INJECTION, SOLUTION INTRAVENOUS CONTINUOUS
Status: DISCONTINUED | OUTPATIENT
Start: 2021-11-02 | End: 2021-11-02 | Stop reason: HOSPADM

## 2021-11-02 RX ORDER — GLYCOPYRROLATE 0.2 MG/ML
INJECTION INTRAMUSCULAR; INTRAVENOUS AS NEEDED
Status: DISCONTINUED | OUTPATIENT
Start: 2021-11-02 | End: 2021-11-02 | Stop reason: HOSPADM

## 2021-11-02 RX ORDER — EPHEDRINE SULFATE/0.9% NACL/PF 50 MG/5 ML
SYRINGE (ML) INTRAVENOUS AS NEEDED
Status: DISCONTINUED | OUTPATIENT
Start: 2021-11-02 | End: 2021-11-02 | Stop reason: HOSPADM

## 2021-11-02 RX ORDER — ROPIVACAINE HYDROCHLORIDE 5 MG/ML
INJECTION, SOLUTION EPIDURAL; INFILTRATION; PERINEURAL AS NEEDED
Status: DISCONTINUED | OUTPATIENT
Start: 2021-11-02 | End: 2021-11-02 | Stop reason: HOSPADM

## 2021-11-02 RX ORDER — SODIUM CHLORIDE 9 MG/ML
125 INJECTION, SOLUTION INTRAVENOUS CONTINUOUS
Status: DISPENSED | OUTPATIENT
Start: 2021-11-02 | End: 2021-11-03

## 2021-11-02 RX ORDER — PROPOFOL 10 MG/ML
INJECTION, EMULSION INTRAVENOUS AS NEEDED
Status: DISCONTINUED | OUTPATIENT
Start: 2021-11-02 | End: 2021-11-02 | Stop reason: HOSPADM

## 2021-11-02 RX ORDER — FENTANYL CITRATE 50 UG/ML
INJECTION, SOLUTION INTRAMUSCULAR; INTRAVENOUS AS NEEDED
Status: DISCONTINUED | OUTPATIENT
Start: 2021-11-02 | End: 2021-11-02 | Stop reason: HOSPADM

## 2021-11-02 RX ORDER — FENTANYL CITRATE 50 UG/ML
25 INJECTION, SOLUTION INTRAMUSCULAR; INTRAVENOUS
Status: DISCONTINUED | OUTPATIENT
Start: 2021-11-02 | End: 2021-11-02 | Stop reason: HOSPADM

## 2021-11-02 RX ORDER — SUCCINYLCHOLINE CHLORIDE 20 MG/ML
INJECTION INTRAMUSCULAR; INTRAVENOUS AS NEEDED
Status: DISCONTINUED | OUTPATIENT
Start: 2021-11-02 | End: 2021-11-02 | Stop reason: HOSPADM

## 2021-11-02 RX ORDER — FENTANYL CITRATE 50 UG/ML
50 INJECTION, SOLUTION INTRAMUSCULAR; INTRAVENOUS AS NEEDED
Status: DISCONTINUED | OUTPATIENT
Start: 2021-11-02 | End: 2021-11-02 | Stop reason: HOSPADM

## 2021-11-02 RX ORDER — DEXMEDETOMIDINE HYDROCHLORIDE 100 UG/ML
INJECTION, SOLUTION INTRAVENOUS AS NEEDED
Status: DISCONTINUED | OUTPATIENT
Start: 2021-11-02 | End: 2021-11-02 | Stop reason: HOSPADM

## 2021-11-02 RX ORDER — MIDAZOLAM HYDROCHLORIDE 1 MG/ML
INJECTION, SOLUTION INTRAMUSCULAR; INTRAVENOUS AS NEEDED
Status: DISCONTINUED | OUTPATIENT
Start: 2021-11-02 | End: 2021-11-02 | Stop reason: HOSPADM

## 2021-11-02 RX ORDER — LIDOCAINE HYDROCHLORIDE AND EPINEPHRINE 10; 10 MG/ML; UG/ML
INJECTION, SOLUTION INFILTRATION; PERINEURAL AS NEEDED
Status: DISCONTINUED | OUTPATIENT
Start: 2021-11-02 | End: 2021-11-02 | Stop reason: HOSPADM

## 2021-11-02 RX ADMIN — FENTANYL CITRATE 25 MCG: 50 INJECTION, SOLUTION INTRAMUSCULAR; INTRAVENOUS at 14:59

## 2021-11-02 RX ADMIN — Medication 80 MCG: at 14:56

## 2021-11-02 RX ADMIN — GLYCOPYRROLATE 0.4 MG: 0.2 INJECTION, SOLUTION INTRAMUSCULAR; INTRAVENOUS at 15:35

## 2021-11-02 RX ADMIN — ACETAMINOPHEN 1000 MG: 500 TABLET ORAL at 05:32

## 2021-11-02 RX ADMIN — Medication 5000 UNITS: at 22:45

## 2021-11-02 RX ADMIN — MIDAZOLAM HYDROCHLORIDE 2 MG: 1 INJECTION, SOLUTION INTRAMUSCULAR; INTRAVENOUS at 14:18

## 2021-11-02 RX ADMIN — SODIUM CHLORIDE, POTASSIUM CHLORIDE, SODIUM LACTATE AND CALCIUM CHLORIDE 25 ML/HR: 600; 310; 30; 20 INJECTION, SOLUTION INTRAVENOUS at 13:58

## 2021-11-02 RX ADMIN — OXYCODONE 5 MG: 5 TABLET ORAL at 16:13

## 2021-11-02 RX ADMIN — PROPOFOL 180 MG: 10 INJECTION, EMULSION INTRAVENOUS at 14:25

## 2021-11-02 RX ADMIN — LIDOCAINE HYDROCHLORIDE 20 MG: 20 INJECTION, SOLUTION INTRAVENOUS at 15:36

## 2021-11-02 RX ADMIN — ROCURONIUM BROMIDE 10 MG: 10 INJECTION INTRAVENOUS at 14:48

## 2021-11-02 RX ADMIN — Medication 3 AMPULE: at 13:57

## 2021-11-02 RX ADMIN — PHENYTOIN SODIUM 200 MG: 100 CAPSULE ORAL at 18:14

## 2021-11-02 RX ADMIN — Medication 80 MCG: at 15:26

## 2021-11-02 RX ADMIN — Medication 1 AMPULE: at 22:46

## 2021-11-02 RX ADMIN — DIAZEPAM 5 MG: 5 TABLET ORAL at 16:13

## 2021-11-02 RX ADMIN — Medication 10 MG: at 14:35

## 2021-11-02 RX ADMIN — PHENYTOIN SODIUM 200 MG: 100 CAPSULE ORAL at 08:17

## 2021-11-02 RX ADMIN — DOCUSATE SODIUM 50MG AND SENNOSIDES 8.6MG 1 TABLET: 8.6; 5 TABLET, FILM COATED ORAL at 08:17

## 2021-11-02 RX ADMIN — LIDOCAINE HYDROCHLORIDE 80 MG: 20 INJECTION, SOLUTION INTRAVENOUS at 14:25

## 2021-11-02 RX ADMIN — DEXMEDETOMIDINE HYDROCHLORIDE 2 MCG: 100 INJECTION, SOLUTION, CONCENTRATE INTRAVENOUS at 15:31

## 2021-11-02 RX ADMIN — DEXMEDETOMIDINE HYDROCHLORIDE 4 MCG: 100 INJECTION, SOLUTION, CONCENTRATE INTRAVENOUS at 15:18

## 2021-11-02 RX ADMIN — CEFAZOLIN SODIUM 2 G: 1 INJECTION, POWDER, FOR SOLUTION INTRAMUSCULAR; INTRAVENOUS at 08:16

## 2021-11-02 RX ADMIN — Medication 80 MCG: at 15:09

## 2021-11-02 RX ADMIN — FENTANYL CITRATE 75 MCG: 50 INJECTION, SOLUTION INTRAMUSCULAR; INTRAVENOUS at 14:25

## 2021-11-02 RX ADMIN — SODIUM CHLORIDE 125 ML/HR: 9 INJECTION, SOLUTION INTRAVENOUS at 02:00

## 2021-11-02 RX ADMIN — ROCURONIUM BROMIDE 20 MG: 10 INJECTION INTRAVENOUS at 14:35

## 2021-11-02 RX ADMIN — Medication 10 ML: at 05:32

## 2021-11-02 RX ADMIN — Medication 120 MCG: at 14:34

## 2021-11-02 RX ADMIN — SODIUM CHLORIDE 125 ML/HR: 900 INJECTION, SOLUTION INTRAVENOUS at 16:19

## 2021-11-02 RX ADMIN — PANTOPRAZOLE SODIUM 40 MG: 40 TABLET, DELAYED RELEASE ORAL at 08:17

## 2021-11-02 RX ADMIN — DEXAMETHASONE SODIUM PHOSPHATE 4 MG: 4 INJECTION, SOLUTION INTRAMUSCULAR; INTRAVENOUS at 14:23

## 2021-11-02 RX ADMIN — NEOSTIGMINE METHYLSULFATE 2 MG: 1 INJECTION, SOLUTION INTRAVENOUS at 15:35

## 2021-11-02 RX ADMIN — PROPOFOL 20 MG: 10 INJECTION, EMULSION INTRAVENOUS at 15:37

## 2021-11-02 RX ADMIN — ACETAMINOPHEN 1000 MG: 500 TABLET ORAL at 18:14

## 2021-11-02 RX ADMIN — ACETAMINOPHEN 1000 MG: 500 TABLET ORAL at 23:44

## 2021-11-02 RX ADMIN — IRBESARTAN 150 MG: 75 TABLET ORAL at 22:45

## 2021-11-02 RX ADMIN — FAMOTIDINE 20 MG: 20 TABLET, FILM COATED ORAL at 18:15

## 2021-11-02 RX ADMIN — DOCUSATE SODIUM 50MG AND SENNOSIDES 8.6MG 1 TABLET: 8.6; 5 TABLET, FILM COATED ORAL at 18:14

## 2021-11-02 RX ADMIN — DEXMEDETOMIDINE HYDROCHLORIDE 4 MCG: 100 INJECTION, SOLUTION, CONCENTRATE INTRAVENOUS at 15:26

## 2021-11-02 RX ADMIN — SUCCINYLCHOLINE CHLORIDE 60 MG: 20 INJECTION, SOLUTION INTRAMUSCULAR; INTRAVENOUS at 14:25

## 2021-11-02 RX ADMIN — SODIUM CHLORIDE 125 ML/HR: 9 INJECTION, SOLUTION INTRAVENOUS at 11:06

## 2021-11-02 RX ADMIN — ONDANSETRON HYDROCHLORIDE 4 MG: 2 INJECTION, SOLUTION INTRAMUSCULAR; INTRAVENOUS at 15:21

## 2021-11-02 RX ADMIN — CEFAZOLIN 2 G: 1 INJECTION, POWDER, FOR SOLUTION INTRAMUSCULAR; INTRAVENOUS; PARENTERAL at 14:40

## 2021-11-02 NOTE — PROGRESS NOTES
TRANSFER - IN REPORT:    Verbal report received from Katy Brink RN(name) on [employer]  being received from PACU(unit) for routine post - op      Report consisted of patients Situation, Background, Assessment and   Recommendations(SBAR). Information from the following report(s) SBAR, Kardex, Intake/Output, MAR and Recent Results was reviewed with the receiving nurse. Opportunity for questions and clarification was provided. Assessment completed upon patients arrival to unit and care assumed.

## 2021-11-02 NOTE — PERIOP NOTES
TRANSFER - OUT REPORT:    Verbal report given to SHARAD Larsen RN(name) on Olga Smalls  being transferred to [facility](unit) for routine progression of care       Report consisted of patients Situation, Background, Assessment and   Recommendations(SBAR). Information from the following report(s) OR Summary, Intake/Output and MAR was reviewed with the receiving nurse. Opportunity for questions and clarification was provided.       Patient transported with:   O2 @ 2 liters  Tech

## 2021-11-02 NOTE — PROGRESS NOTES
End of Shift Note    Bedside shift change report given to ashley ortiz (oncoming nurse) by Codie Medina RN (offgoing nurse). Report included the following information SBAR, Kardex, Intake/Output, MAR and Recent Results    Shift worked:  7p-7a     Shift summary and any significant changes:          Concerns for physician to address:       Zone phone for oncoming shift:   [phone]       Activity:  Activity Level: Bed Rest  Number times ambulated in hallways past shift: 0  Number of times OOB to chair past shift: 0    Cardiac:   Cardiac Monitoring: No           Access:   Current line(s): PIV     Genitourinary:   Urinary status: basilio    Respiratory:   O2 Device: Nasal cannula  Chronic home O2 use?: NO  Incentive spirometer at bedside: YES     GI:     Current diet:  DIET NPO  Passing flatus: YES  Tolerating current diet: YES       Pain Management:   Patient states pain is manageable on current regimen: YES    Skin:  Michael Score: 19  Interventions: float heels, increase time out of bed and PT/OT consult    Patient Safety:  Fall Score:  Total Score: 2  Interventions: assistive device (walker, cane, etc), gripper socks and pt to call before getting OOB  High Fall Risk: Yes    Length of Stay:  Expected LOS: - - -  Actual LOS: 0      Jamil Villela RN

## 2021-11-02 NOTE — PROGRESS NOTES
TRANSFER - OUT REPORT:    Verbal report given to Irasema(name) on Gabrielle Mauricio  being transferred to pre-op holding(unit) for routine progression of care       Report consisted of patients Situation, Background, Assessment and   Recommendations(SBAR). Information from the following report(s) SBAR, Kardex, Intake/Output and MAR was reviewed with the receiving nurse. Lines:   Peripheral IV 11/01/21 Left;Posterior Hand (Active)   Site Assessment Clean, dry, & intact 11/02/21 0742   Phlebitis Assessment 0 11/02/21 0742   Infiltration Assessment 0 11/02/21 0742   Dressing Status Clean, dry, & intact 11/02/21 0742   Dressing Type Tape;Transparent 11/02/21 0742   Hub Color/Line Status Pink 11/02/21 0742   Alcohol Cap Used Yes 11/01/21 1944        Opportunity for questions and clarification was provided.       Patient transported with:   [employer]

## 2021-11-02 NOTE — BRIEF OP NOTE
Brief Postoperative Note    Patient: Will Leal  YOB: 1945  MRN: [id number]    Date of Procedure: 11/2/2021     Pre-Op Diagnosis: LUMBOSACRAL RADICULOPATHY, LUMBAR SPINE PAIN, BILATERAL SCIATICA, SPONDYLOLISTHESIS, LUMBAR REGION, SPINAL STENOSIS LUMBAR REGION, WITH NEUROGENIC CLAUDICATION, FORAMINAL STENOSIS OF LUMBAR REGION, WEAKNESS OF BOTH LOWER EXTREMITIES    Post-Op Diagnosis: Same as preoperative diagnosis. Procedure(s):  L4-5 POSTERIOR DECOMPRESSION AND FUSION WITH GLOBUS    Surgeon(s):  Pat Longo MD    Surgical Assistant: Physician Assistant: TYESHA Nava    Anesthesia: General     Estimated Blood Loss (mL): 90JX    Complications: None    Specimens: * No specimens in log *     Implants:   Implant Name Type Inv. Item Serial No.  Lot No. LRB No. Used Action   GRAFT BONE 5 CC - [id number]  GRAFT BONE 5 CC [id number] [employer]WD N/A Left 1 Implanted   SCREW SPNL L45MM DIA7. 5MM KYLE MOD CREO - SN/A  SCREW SPNL L45MM DIA7. 5MM KYLE MOD CREO N/A [employer]WD N/A Left 1 Implanted   CREO MIS MOD POLYAX [place] 30MM - SN/A  CREO MIS MOD POLYAX [place] 30MM N/A [employer]WD N/A Left 4 Implanted   60 mm darrell   [id number]  N/A Left 2 Implanted   SCREW SPNL KYLE 7.5X50 MM MOD HA STRL CREO AMP - SN/A  SCREW SPNL KYLE 7.5X50 MM MOD HA STRL CREO AMP N/A [employer]WD N/A Left 3 Implanted   CAP SPNL POST FACET JT FIDELINA FOR INTEGR DARRELL REDUC CREO MIS - SN/A  CAP SPNL POST FACET JT FIDELINA FOR INTEGR DARRELL [address] CREO MIS N/A [employer]WD N/A Left 4 Implanted       Drains: * No LDAs found *    Findings: spinal stenosis    Electronically Signed by TYESHA Reza on 11/2/2021 at 3:45 PM

## 2021-11-02 NOTE — OP NOTES
[facility]  OPERATIVE REPORT    Name:  Gabrielle Mauricio  MR#:  [id number]  :  1945  DATE OF SERVICE:  2021    PREOPERATIVE DIAGNOSES:  1. Bilateral sciatica    2. Foraminal stenosis of lumbar region    3. Spinal stenosis, lumbar region, with neurogenic claudication    4. Spondylolisthesis, lumbar region    5. Lumbar spine pain    6. Weakness of both lower extremities    7. Lumbosacral radiculopathy        POSTOPERATIVE DIAGNOSES:  1. Bilateral sciatica    2. Foraminal stenosis of lumbar region    3. Spinal stenosis, lumbar region, with neurogenic claudication    4. Spondylolisthesis, lumbar region    5. Lumbar spine pain    6. Weakness of both lower extremities    7. Lumbosacral radiculopathy        PROCEDURES PERFORMED:  1. L4-5 posterior arthrodesis. 2. L4-5 posterior instrumentation. 3. Bone marrow aspirate through separate fascial incision for spinal fusion augmentation. 4. Use of [employer] robotic system (Stereotactic computer-assisted system) for placement of pedicle screws. 5. Use of allograft bone for spinal fusion. SURGEON:  Noelle Saucedo MD    FIRST ASSISTANT:  TYESHA Coleman  Mr. Dennis Weber has undergone a major surgery. Dolores Jeffery knowledge about the pertinent anatomy, procedural steps and equipment requirent in this procedure was medically necessary to ensure the safety of the patient. His assistance has helped reduce surgical time by 35%. Surgical tasks included, but are not limited to:  1) Safe and proper retraction. 2) Maintenance of visualization during surgery by helping with tasks such as suctioning. 3) Multiple tasks throughout the decompression and instrumentation to allow for timely and safe completion of the procedure. 4) Overall assistance helped decrease the risk complications such as infection, bleeding, hardware malposition, and damage to surrounding structures. ANESTHESIA:  GETA. COMPLICATIONS:  None.     SPECIMENS: None.    IMPLANTS:    1. Globus CREO pedicle screw system    ESTIMATED BLOOD LOSS:  50 mL. DRAINS:  None. PRE-OP ANTIBIOTICS: Ancef. INDICATIONS FOR PROCEDURE:    Lisa Willis is a 66 y.o. male who has the above listed diagnosis. Mr. Krista Mckenna has failed to improve with non-operative treatment and has chosen to proceed with surgical intervention. We had a long conversation about pros and cons of surgery, risks, possible complications, alternative treatments, and Lisa Willis had an opportunity to ask questions and is satisfied with my answers and explanations. I have personally given warnings about possible complications including but not limited to death, permanent disability, heart attack, stroke, lung injury or infection, blindness, ileus, bladder or bowel problems, ureter injury, bleeding, blood vessel injury, nerve injury (including numbness, pain and weakness), paralysis (which may be permanent), failure to heal, failure to fuse bone together in fusion procedures, failure to relief symptoms, failure to relief pain, increased pain, need for further surgeries, failure or breakage or hardware, malpositioning of hardware, need to fuse or operate on additional levels determined either during or after surgery, destabilization of the spine (which may require fusion or later surgery), infections (which may or may not require additional surgery), dural tears (tears of the sac holding in nerves and spinal fluid), meningitis, blood clots, pulmonary embolus, recurrent disc herniation, and anesthetic complications. Comorbidities such as obesity, smoking, rheumatoid arthritis, chronic steroid use and diabetes increase these risks. .  Mr. Krista Mckenna  understands and wants to proceed.     NARRATIVE OF THE PROCEDURE:    After informed consent was obtained and Lisa Willis had a chance to ask any last minute questions, the patient was transported to the operating room and underwent general endotracheal anesthesia. Jorge Gamboa was positioned prone on the [facility] table and the body was properly padded. Fluoroscopy was used to linda the level of the incision and the site was prepped and draped in the usual manner. A time out was obtained and we verified that we had the correct patient the correct site and that the proper IV antibiotics had been administered in accordance with SCIP protocol. I proceeded to perform a stab incision over the left iliac crest.  A Jamshidi needle was inserted in the left iliac crest and 20 mL os bone marrow was aspirated. The aspirate was mixed with the allograft bone and used for the fusion later in the procedure. The Tvinci robotic navigation marker was placed through the same incision and a secondary marker was placed on the contralateral side. Fluoroscopy was brought into the field and the spinal levels from L4 through L5 were registered on the AP and lateral views. The [employer] robotic system merged the images from fluoroscopy with the pre-operative CT and guided us for the placement of Liz approaches. Subsequently, screws were placed with the following technique; the robotic arm guided the proper trajectory, the soft tissue was cleared with a dilator, a high speed drill was used to create an opening on the cortical bone in line with the pedicle, the pedicle was cannulated with a tap and finally the pre-measured screw was inserted. The placement of pedicle screws was repeated in the exact same manner from L4 through L5 bilaterally. Once the screw placement had been completed, the [employer] robotic system was removed and fluoroscopy was used on AP and lateral views to confirm proper placement of the screws. The posterior screw heights were adjusted and a caliper was used to measure the joyce length. The joyce was selected and placed across the posterior screws from L4 to L5 bilaterally.   Locking caps were placed at every level and final tightened with the final tightening device (completing the posterior instrumentation). The posterior elements were decorticated from L4 to L5. Allograft bone soaked in bone marrow aspirate was placed from L4 to L5 to complete the posterior fusion. Final fluoroscopy images were taken and saved to PACS. The fascia was closed with #1 vicryl, the subcutaneous tissues with 2-0 vicryl and the skin with staples. The patient was then awakened and transfer to PACU in stable condition. POSTOPERATIVE PLAN:  The patient will have SCDs for DVT prophylaxis and IV antibiotics for infection prophylaxis. COUNTS: Sponge and needle counts were correct.     SIGNED BY:  Arnel Carballo MD     November 2, 2021
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
## 2021-11-02 NOTE — ANESTHESIA POSTPROCEDURE EVALUATION
Procedure(s):  L4-5 POSTERIOR DECOMPRESSION AND FUSION WITH GLOBUS. general    Anesthesia Post Evaluation      Multimodal analgesia: multimodal analgesia used between 6 hours prior to anesthesia start to PACU discharge  Patient location during evaluation: bedside  Patient participation: complete - patient participated  Level of consciousness: awake  Pain management: adequate  Airway patency: patent  Anesthetic complications: no  Cardiovascular status: acceptable  Respiratory status: acceptable  Hydration status: acceptable  Post anesthesia nausea and vomiting:  controlled  Final Post Anesthesia Temperature Assessment:  Normothermia (36.0-37.5 degrees C)      INITIAL Post-op Vital signs:   Vitals Value Taken Time   /59 11/02/21 1645   Temp 36.8 °C (98.2 °F) 11/02/21 1645   Pulse 79 11/02/21 1658   Resp 13 11/02/21 1658   SpO2 97 % 11/02/21 1658   Vitals shown include unvalidated device data.

## 2021-11-02 NOTE — PROGRESS NOTES
Physical Therapy    Orders received, chart reviewed, and evaluation complete. Completed bed mob CGA, transfers CGA, and ambulated x60ft with RW and CGA. Gait mildly ataxic and unsteady but no LOB. Patient reported L side hip pain w/ active hip flexion but did not report pain when ambulating. Denies numbness/tingling or radicular pain in BLE. Patient did not report any weakness in LEs with short distance.     John Barreto, SPT

## 2021-11-02 NOTE — ROUTINE PROCESS
sbar In note pt to holding are identifies self and procedure for today. Pt has been npo   Except for meds. Alert and oriented   consents in order. No mepilex applied as  This is surgical site. Skin intact. Left flank  Suture site glued  dit.

## 2021-11-02 NOTE — PROGRESS NOTES
Ortho / Neurosurgery NP Note    POD# 1  s/p L4-5 LATERAL FUSION FROM LEFT SIDED APPROACH   Pt seen with no visitor present. Pt resting in bed, in NAD. Reports incisional pain with active hip flexion. Seen by PT earlier, unsteady/ataxic gait  Denies leg pain/numbness/tingling      VSS Afebrile. Room air. Visit Vitals  /63   Pulse 72   Temp 98.1 °F (36.7 °C)   Resp 16   Ht 6' 1.5\" (1.867 m)   Wt 104.1 kg (229 lb 8 oz)   SpO2 96%   BMI 29.87 kg/m²       Voiding status: Godfrey - d/c after Stage 2   Output (mL)  Last Bowel Movement Date: 10/31/21 (11/02/21 0742)      Labs    Lab Results   Component Value Date/Time    HGB 11.2 (L) 11/02/2021 03:05 AM      Lab Results   Component Value Date/Time    INR 1.1 10/25/2021 02:32 PM      Lab Results   Component Value Date/Time    Sodium 136 10/25/2021 02:32 PM    Potassium 4.5 10/25/2021 02:32 PM    Chloride 106 10/25/2021 02:32 PM    CO2 28 10/25/2021 02:32 PM    Glucose 104 (H) 10/25/2021 02:32 PM    BUN 23 (H) 10/25/2021 02:32 PM    Creatinine 1.03 10/25/2021 02:32 PM    Calcium 9.2 10/25/2021 02:32 PM     Recent Glucose Results: No results found for: GLU, GLUPOC, GLUCPOC        Body mass index is 29.87 kg/m². : A BMI > 30 is classified as obesity and > 40 is classified as morbid obesity. Dressing c.d.i  Calves soft and supple;  No pain with passive stretch  Sensation and motor intact   SCDs for mechanical DVT proph while in bed     PLAN:  1) Neurovascular assessment q4 hours   2) PT/OT - LSO (materials management contacted for brace)  3) Pain control - scheduled tylenol, prn oxycodone   4) Readniess for discharge:     [x] Vital Signs stable    [x] Hgb stable    [] + Voiding    [x] Wound intact, drainage minimal    [] Tolerating PO intake     [] Cleared by PT (OT if applicable)     [] Stair training completed (if applicable)    [] Independent / Contact Guard Assist (household distance)     [] Bed mobility     [] Car transfers     [] ADLs    [x] Adequate pain control on oral medication alone     NPO - OR today for Stage 2.     Twyla Presume, NP

## 2021-11-02 NOTE — ROUTINE PROCESS
TRANSFER - IN REPORT: 
 
Verbal report received from [id number]. Ricky CRNA(name) on Tequila Marie  being received from OR(unit) for routine post - op Report consisted of patients Situation, Background, Assessment and  
Recommendations(SBAR). Information from the following report(s) OR Summary was reviewed with the receiving nurse. Opportunity for questions and clarification was provided. Assessment completed upon patients arrival to unit and care assumed.

## 2021-11-02 NOTE — PROGRESS NOTES
End of Shift Note    Bedside shift change report given to Deneen Lamb (oncoming nurse) by Duong Clark RN (offgoing nurse). Report included the following information SBAR and Kardex    Shift worked:  day     Shift summary and any significant changes:     pod 1 and 0,      Concerns for physician to address:  none     Zone phone for oncoming shift:   [phone]       Activity:  Activity Level: Up with Assistance  Number times ambulated in hallways past shift: 0  Number of times OOB to chair past shift: 0    Cardiac:   Cardiac Monitoring: No      Cardiac Rhythm: Sinus Rhythm    Access:   Current line(s): PIV     Genitourinary:   Urinary status: basilio    Respiratory:   O2 Device: Nasal cannula  Chronic home O2 use?: NO  Incentive spirometer at bedside: YES  Actual Volume (ml): 2000 ml  GI:  Last Bowel Movement Date: 10/30/21  Current diet:  ADULT DIET Regular  Passing flatus: no  Tolerating current diet: yes       Pain Management:   Patient states pain is manageable on current regimen: YES    Skin:  Michael Score: 19  Interventions: float heels, increase time out of bed and PT/OT consult    Patient Safety:  Fall Score:  Total Score: 1  Interventions: bed/chair alarm, assistive device (walker, cane, etc) and gripper socks  High Fall Risk: Yes    Length of Stay:  Expected LOS: - - -  Actual LOS: 0      Duong Clark RN

## 2021-11-02 NOTE — PROGRESS NOTES
Oral and Written notification given to patient and/or caregiver informing them that they are currently an Outpatient receiving care in our facility. Outpatient services include Observation Services.      Gregg Arnold, [address] [place] [address] Drive

## 2021-11-02 NOTE — PERIOP NOTES
James Mae Ref: [id number]-USA Lot: [id number] Exp: 07- Floseal Hemostatic 10mL LOT: [id number] REF: [id number] Exp: 07-

## 2021-11-02 NOTE — ANESTHESIA PREPROCEDURE EVALUATION
Relevant Problems   No relevant active problems       Anesthetic History   No history of anesthetic complications            Review of Systems / Medical History  Patient summary reviewed, nursing notes reviewed and pertinent labs reviewed    Pulmonary  Within defined limits                 Neuro/Psych     seizures: well controlled         Cardiovascular    Hypertension: well controlled              Exercise tolerance: >4 METS     GI/Hepatic/Renal     GERD: well controlled           Endo/Other        Arthritis     Other Findings              Physical Exam    Airway  Mallampati: II  TM Distance: 4 - 6 cm  Neck ROM: normal range of motion   Mouth opening: Normal     Cardiovascular  Regular rate and rhythm,  S1 and S2 normal,  no murmur, click, rub, or gallop  Rhythm: regular  Rate: normal         Dental  No notable dental hx       Pulmonary  Breath sounds clear to auscultation               Abdominal  GI exam deferred       Other Findings            Anesthetic Plan    ASA: 2  Anesthesia type: general    Monitoring Plan: BIS      Induction: Intravenous  Anesthetic plan and risks discussed with: Patient

## 2021-11-02 NOTE — PROGRESS NOTES
Problem: Falls - Risk of  Goal: *Absence of Falls  Description: Document Isaías Roth Fall Risk and appropriate interventions in the flowsheet.   Outcome: Progressing Towards Goal  Note: Fall Risk Interventions:  Mobility Interventions: Communicate number of staff needed for ambulation/transfer    Mentation Interventions: Adequate sleep, hydration, pain control    Medication Interventions: Patient to call before getting OOB    Elimination Interventions: Call light in reach, Patient to call for help with toileting needs
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
## 2021-11-03 ENCOUNTER — APPOINTMENT (OUTPATIENT)
Dept: GENERAL RADIOLOGY | Age: 76
DRG: 455 | End: 2021-11-03
Attending: PHYSICIAN ASSISTANT
Payer: MEDICARE

## 2021-11-03 LAB — HGB BLD-MCNC: 10.8 G/DL (ref 12.1–17)

## 2021-11-03 PROCEDURE — 74011250637 HC RX REV CODE- 250/637: Performed by: PHYSICIAN ASSISTANT

## 2021-11-03 PROCEDURE — 97116 GAIT TRAINING THERAPY: CPT

## 2021-11-03 PROCEDURE — 65270000029 HC RM PRIVATE

## 2021-11-03 PROCEDURE — 51798 US URINE CAPACITY MEASURE: CPT

## 2021-11-03 PROCEDURE — 85018 HEMOGLOBIN: CPT

## 2021-11-03 PROCEDURE — 97168 OT RE-EVAL EST PLAN CARE: CPT

## 2021-11-03 PROCEDURE — 74011250636 HC RX REV CODE- 250/636: Performed by: PHYSICIAN ASSISTANT

## 2021-11-03 PROCEDURE — 36415 COLL VENOUS BLD VENIPUNCTURE: CPT

## 2021-11-03 PROCEDURE — 94760 N-INVAS EAR/PLS OXIMETRY 1: CPT

## 2021-11-03 PROCEDURE — 72100 X-RAY EXAM L-S SPINE 2/3 VWS: CPT

## 2021-11-03 PROCEDURE — 77010033678 HC OXYGEN DAILY

## 2021-11-03 PROCEDURE — 97535 SELF CARE MNGMENT TRAINING: CPT

## 2021-11-03 PROCEDURE — 97164 PT RE-EVAL EST PLAN CARE: CPT

## 2021-11-03 PROCEDURE — 97530 THERAPEUTIC ACTIVITIES: CPT

## 2021-11-03 RX ORDER — AMOXICILLIN 250 MG
1 CAPSULE ORAL 2 TIMES DAILY
Qty: 14 TABLET | Refills: 0 | Status: SHIPPED | OUTPATIENT
Start: 2021-11-03 | End: 2021-11-10

## 2021-11-03 RX ORDER — POLYETHYLENE GLYCOL 3350 17 G/17G
17 POWDER, FOR SOLUTION ORAL DAILY
Qty: 7 PACKET | Refills: 0 | Status: SHIPPED | OUTPATIENT
Start: 2021-11-04 | End: 2021-11-11

## 2021-11-03 RX ORDER — ACETAMINOPHEN 500 MG
1000 TABLET ORAL EVERY 6 HOURS
Qty: 56 TABLET | Refills: 0 | Status: SHIPPED | OUTPATIENT
Start: 2021-11-03 | End: 2021-11-10

## 2021-11-03 RX ORDER — OXYCODONE HYDROCHLORIDE 5 MG/1
5-10 TABLET ORAL
Qty: 30 TABLET | Refills: 0 | Status: SHIPPED | OUTPATIENT
Start: 2021-11-03 | End: 2021-11-10

## 2021-11-03 RX ADMIN — PHENYTOIN SODIUM 200 MG: 100 CAPSULE ORAL at 08:54

## 2021-11-03 RX ADMIN — ACETAMINOPHEN 1000 MG: 500 TABLET ORAL at 11:45

## 2021-11-03 RX ADMIN — Medication 1 AMPULE: at 08:19

## 2021-11-03 RX ADMIN — Medication 5000 UNITS: at 21:10

## 2021-11-03 RX ADMIN — THERA TABS 1 TABLET: TAB at 08:55

## 2021-11-03 RX ADMIN — OXYCODONE 5 MG: 5 TABLET ORAL at 17:45

## 2021-11-03 RX ADMIN — IRBESARTAN 150 MG: 75 TABLET ORAL at 21:10

## 2021-11-03 RX ADMIN — DOCUSATE SODIUM 50MG AND SENNOSIDES 8.6MG 1 TABLET: 8.6; 5 TABLET, FILM COATED ORAL at 08:55

## 2021-11-03 RX ADMIN — DOCUSATE SODIUM 50MG AND SENNOSIDES 8.6MG 1 TABLET: 8.6; 5 TABLET, FILM COATED ORAL at 17:45

## 2021-11-03 RX ADMIN — ACETAMINOPHEN 1000 MG: 500 TABLET ORAL at 17:45

## 2021-11-03 RX ADMIN — OXYCODONE 5 MG: 5 TABLET ORAL at 03:00

## 2021-11-03 RX ADMIN — PANTOPRAZOLE SODIUM 40 MG: 40 TABLET, DELAYED RELEASE ORAL at 08:55

## 2021-11-03 RX ADMIN — PSYLLIUM HUSK 1 PACKET: 3.4 POWDER ORAL at 08:54

## 2021-11-03 RX ADMIN — OXYCODONE 5 MG: 5 TABLET ORAL at 08:55

## 2021-11-03 RX ADMIN — PSYLLIUM HUSK 1 PACKET: 3.4 POWDER ORAL at 17:45

## 2021-11-03 RX ADMIN — SODIUM CHLORIDE 125 ML/HR: 900 INJECTION, SOLUTION INTRAVENOUS at 08:19

## 2021-11-03 RX ADMIN — Medication 1 AMPULE: at 21:13

## 2021-11-03 RX ADMIN — PHENYTOIN SODIUM 200 MG: 100 CAPSULE ORAL at 19:21

## 2021-11-03 RX ADMIN — OXYCODONE 5 MG: 5 TABLET ORAL at 11:45

## 2021-11-03 NOTE — PROGRESS NOTES
Problem: Self Care Deficits Care Plan (Adult)  Goal: *Acute Goals and Plan of Care (Insert Text)  Description: FUNCTIONAL STATUS PRIOR TO ADMISSION: Patient was independent and active without use of DME.    HOME SUPPORT: The patient lived with wife but did not require assist.    Occupational Therapy Goals  Initiated 11/2/2021    1. Patient will perform lower body dressing with modified independence using Reacher, Stocking Aid, Long [employer], and Dressing Stick PRN within 7 days. 2.  Patient will perform toileting with modified independence using most appropriate DME within 7 days. 3.  Patient will grooming at independence within 7 days. 4.  Patient will don/doff back brace at modified independence within 7 days. 5.  Patient will verbalize/demonstrate 3/3 back precautions during ADL tasks without cues within 7 days. Outcome: Progressing Towards Goal    OCCUPATIONAL THERAPY RE-EVALUATION  Patient: Will Leal (81 y.o. male)  Date: 11/3/2021  Diagnosis: Post-op pain [G89.18]  Spinal stenosis [M48.00]   <principal problem not specified>  Procedure(s) (LRB):  L4-5 POSTERIOR DECOMPRESSION AND FUSION WITH GLOBUS (Left) 1 Day Post-Op  Precautions: Spinal  Chart, occupational therapy assessment, plan of care, and goals were reviewed. ASSESSMENT  Based on the objective data described below, patient is POD #1 following 2-part L4-5 lateral and posterior decompression + fusion and presents with decreased sitting balance, general deconditioning, and decreased activity tolerance. On this date sensation and vision intact. Pt required up to min A to complete log rolling and transition to EOB. LSO brace not present in pts room/available - NP notified and aware, cleared to complete dressing at EOB. Pt indicating increased pain at EOB however agreeable to complete adaptive dressing. Pt required up to min A don socks via sock aid and don underwear using reacher.  Difficulty completing LB dressing as pt having posterior LOB and difficulty maintaining balance when seated EOB. At conclusion of session pt showed video demo of donning/doffing LSO in prep for having brace prior to DC home. Pt then returned to supine with min A to manage BLEs, SCDs donned, bed alarm activated, call bell within reach and all needs met. Continue to recommend [address] at DC with assist from family at DC. Current Level of Function Impacting Discharge (ADLs): up to min A for dressing, anticipate mod A for toileting     Other factors to consider for discharge: independent at baseline, has supportive family          PLAN :  Recommendations and Planned Interventions: self care training, functional mobility training, therapeutic exercise, balance training, therapeutic activities, patient education, home safety training, and family training/education    Frequency/Duration: Patient will be followed by occupational therapy 5 times a week to address goals. Recommend with staff: Trudy Shea for all meals or seated EOB     Recommend next OT session: toileting     Recommendation for discharge: (in order for the patient to meet his/her long term goals)  Occupational therapy at least 2 days/week in the home & SPV for bathing tasks    This discharge recommendation:  Has been made in collaboration with the attending provider and/or case management    Equipment recommendations for successful discharge (if) home: patient owns DME required for discharge; provided with hip kit        SUBJECTIVE:   Patient stated I just am very unhappy I don't have my brace .     OBJECTIVE DATA SUMMARY:   Hospital course since last seen and reason for reevaluation: 2 stage back surgery    Cognitive/Behavioral Status:  Neurologic State: Alert  Orientation Level: Oriented X4; Appropriate for age  Cognition: Appropriate decision making  Perception: Appears intact  Perseveration: No perseveration noted  Safety/Judgement: Awareness of environment    Skin: intact    Edema: no edema noted, SCDs donned to BLE     Hearing: Auditory  Auditory Impairment: Hard of hearing, bilateral  Hearing Aids/Status: Bilateral    Vision/Perceptual:       Intact, intact                              Range of Motion:    AROM: Generally decreased, functional (active L hip flex painful)  PROM: Within functional limits                      Strength:    Strength: Generally decreased, functional                Coordination:  Coordination: Within functional limits            Tone & Sensation:    Tone: Normal  Sensation: Impaired (lateral toes tingling )                        Functional Mobility and Transfers for ADLs:  Bed Mobility:  Rolling: Minimum assistance  Supine to Sit: Minimum assistance  Sit to Supine: Supervision  Scooting: Supervision    Transfers:  Sit to Stand: Minimum assistance (increased trunk flexion)          Balance:  Sitting: Impaired  Sitting - Static: Good (unsupported)  Sitting - Dynamic: Fair (occasional)  Standing: Impaired; With support  Standing - Static: Fair; Constant support  Standing - Dynamic : Fair; Constant support    ADL Assessment:  Feeding: Independent    Oral Facial Hygiene/Grooming: Independent    Bathing: Moderate assistance    Upper Body Dressing: Minimum assistance    Lower Body Dressing: Moderate assistance    Toileting: Maximum assistance                ADL Intervention and task modifications:                           Lower Body Dressing Assistance  Underpants: Minimum assistance  Socks: Minimum assistance  Position Performed: Seated edge of bed  Cues: Verbal cues provided; Physical assistance  Adaptive Equipment Used: Reacher; Sock aid         Cognitive Retraining  Safety/Judgement: Awareness of environment    Therapeutic Exercises:   Educated pt on completing ankle pumps while in bed.      Functional Measure:    Barthel Index:  Bathin  Bladder: 5  Bowels: 10  Groomin  Dressin  Feeding: 10  Mobility: 0  Stairs: 0  Toilet Use: 5  Transfer (Bed to Chair and Back): 10  Total: 50/100      The Barthel ADL Index: Guidelines  1. The index should be used as a record of what a patient does, not as a record of what a patient could do. 2. The main aim is to establish degree of independence from any help, physical or verbal, however minor and for whatever reason. 3. The need for supervision renders the patient not independent. 4. A patient's performance should be established using the best available evidence. Asking the patient, friends/relatives and nurses are the usual sources, but direct observation and common sense are also important. However direct testing is not needed. 5. Usually the patient's performance over the preceding 24-48 hours is important, but occasionally longer periods will be relevant. 6. Middle categories imply that the patient supplies over 50 per cent of the effort. 7. Use of aids to be independent is allowed. Score Interpretation (from [address] [address])    Independent   60-79 Minimally independent   40-59 Partially dependent   20-39 Very dependent   <20 Totally dependent     -Facundo Woods., Barthel, D.W. (1965). Functional evaluation: the Barthel Index. [address] [facility] ([address] (1997). The Barthel activities of daily living index: self-reporting versus actual performance in the old (> or = 75 years). Journal of [address] [place] [address] 45(7), 14 [facility], .Willis.F, Prateek Armenta., Nam Ayers. (1999). Measuring the change in disability after inpatient rehabilitation; comparison of the responsiveness of the Barthel Index and Functional [place] Measure. Journal of Neurology, Neurosurgery, and Psychiatry, 66(4), 347-352. Sim Courtney, N.J.A, RICHARD Warren, & Katherine Correa MWillisA. (2004) Assessment of post-stroke quality of life in cost-effectiveness studies: The usefulness of the Barthel Index and the EuroQoL-5D.  Quality of Life Research, 13, 427-43        Pain:  Pt endorsing 5/10 pain in back during session. Decrease in pain when positioned for comfort supine in bed. Activity Tolerance:   Fair    After treatment patient left in no apparent distress:   Supine in bed, Call bell within reach, Bed / chair alarm activated, and Side rails x 3    COMMUNICATION/COLLABORATION:   The patients plan of care was discussed with: Physical therapist, Occupational therapist, and Registered nurse.      Rajiv Bridges OT  Time Calculation: 32 mins

## 2021-11-03 NOTE — PROGRESS NOTES
Ortho / Neurosurgery NP Note    POD# 1 s/p L4-5 POSTERIOR DECOMPRESSION AND FUSION WITH GLOBUS  POD# 2  s/p L4-5 LATERAL FUSION FROM LEFT SIDED APPROACH   Pt seen with no visitor present. Pt resting in bed, in NAD. Reports expected incisional pain with activity, relieved by oxycodone. States burning in left foot has improved. Denies leg pain/numbness/tingling    Tolerating regular diet. No nausea. VSS Afebrile. Room air. Visit Vitals  /61 (BP 1 Location: Right upper arm, BP Patient Position: Sitting)   Pulse 77   Temp 99.5 °F (37.5 °C)   Resp 16   Ht 6' 1.5\" (1.867 m)   Wt 104.1 kg (229 lb 8 oz)   SpO2 97%   BMI 29.87 kg/m²       Voiding status: Godfrey d/c - due to void   Output (mL)  Last Bowel Movement Date: 10/31/21 (11/03/21 0829)      Labs    Lab Results   Component Value Date/Time    HGB 10.8 (L) 11/03/2021 03:06 AM      Lab Results   Component Value Date/Time    INR 1.1 10/25/2021 02:32 PM      Lab Results   Component Value Date/Time    Sodium 136 10/25/2021 02:32 PM    Potassium 4.5 10/25/2021 02:32 PM    Chloride 106 10/25/2021 02:32 PM    CO2 28 10/25/2021 02:32 PM    Glucose 104 (H) 10/25/2021 02:32 PM    BUN 23 (H) 10/25/2021 02:32 PM    Creatinine 1.03 10/25/2021 02:32 PM    Calcium 9.2 10/25/2021 02:32 PM     Recent Glucose Results: No results found for: GLU, GLUPOC, GLUCPOC        Body mass index is 29.87 kg/m². : A BMI > 30 is classified as obesity and > 40 is classified as morbid obesity. Lateral prineo dressing c.d.i  Posterior CANDICE dressing c.d.i - will change to optifoam prior to d/c   Calves soft and supple; No pain with passive stretch  Sensation and motor intact   SCDs for mechanical DVT proph while in bed     PLAN:  1) Neurovascular assessment q4 hours   2) PT/OT - LSO (materials management contacted for brace)  3) Pain control - scheduled tylenol, prn oxycodone   4) D/C Godfrey - recently seen by Urologist OP for dysuria and nocturia.  States he was prescribed flomax and proscar but has not started taking them. D/c Godfrey today, monitor for retention. May add flomax, proscar if BP can tolerate. 5) Readniess for discharge:     [x] Vital Signs stable    [x] Hgb stable    [] + Voiding    [x] Wound intact, drainage minimal    [x] Tolerating PO intake     [] Cleared by PT (OT if applicable)     [] Stair training completed (if applicable)    [] Independent / Contact Guard Assist (household distance)     [] Bed mobility     [] Car transfers     [] ADLs    [x] Adequate pain control on oral medication alone     Mobilize today, monitor voiding status. Plans to return home with family's support.      Vangie Dave NP

## 2021-11-03 NOTE — PROGRESS NOTES
End of Shift Note    Bedside shift change report given to  (oncoming nurse) by Brent King RN (offgoing nurse). Report included the following information SBAR, Kardex, Procedure Summary, Intake/Output, MAR and Recent Results    Shift worked:  night     Shift summary and any significant changes:    No significant changes. Minimal complaints of pain, prn pain meds given. Labs drawn. Concerns for physician to address:       Zone phone for oncoming shift:          Activity:  Activity Level: Up with Assistance  Number times ambulated in hallways past shift: 0  Number of times OOB to chair past shift: 0    Cardiac:   Cardiac Monitoring: No      Cardiac Rhythm: Sinus Rhythm    Access:   Current line(s): PIV     Genitourinary:   Urinary status: basilio    Respiratory:   O2 Device: Nasal cannula  Chronic home O2 use?: NO  Incentive spirometer at bedside: YES  Actual Volume (ml): 2000 ml  GI:  Last Bowel Movement Date: 10/30/21  Current diet:  ADULT DIET Regular  Passing flatus: YES  Tolerating current diet: YES       Pain Management:   Patient states pain is manageable on current regimen: YES    Skin:  Michael Score: 19  Interventions: increase time out of bed and PT/OT consult    Patient Safety:  Fall Score:  Total Score: 1  Interventions: assistive device (walker, cane, etc) and pt to call before getting OOB  High Fall Risk: Yes    Length of Stay:  Expected LOS: - - -  Actual LOS: 1      Brent King RN

## 2021-11-03 NOTE — PROGRESS NOTES
ORTHO POST OP SPINE PROGRESS NOTE    November 3, 2021  Admit Date: 2021  Admit Diagnosis: Post-op pain [G89.18]  Spinal stenosis [M48.00]  Procedure: Procedure(s):  L4-5 POSTERIOR DECOMPRESSION AND FUSION WITH GLOBUS  Post Op day: 1 Day Post-Op    Subjective:     Donna Hernández is a patient who has complaints of pain in the low back s/p L4-5 lat and post fusion. preop difficulty was mainly fatigue in legs when walking. he did note some burning pain in L foot at one time. was feeling improved after lateral decomp and fusion. tolerating po. basilio remains. did meet with urology preop Dr. Robyn Brown who had prescribed flomax for possible urinary issues. .     Review of Systems: Pertinent items are noted in HPI. Objective:     PT/OT:   Distance Ambulated:           Time Ambulated (min):        Ambulation Response: Activity Response: Fairly tolerated  Assistive Device:              Assistive Device: Fall prevention device    Vital Signs:    Blood pressure 101/60, pulse 73, temperature 98.2 °F (36.8 °C), resp. rate 16, height 6' 1.5\" (1.867 m), weight 104.1 kg (229 lb 8 oz), SpO2 96 %. Temp (24hrs), Av.2 °F (36.8 °C), Min:98 °F (36.7 °C), Max:98.7 °F (37.1 °C)      No intake/output data recorded.  1901 -  0700  In: 1100 [P.O.:150;  I.V.:950]  Out: 2400 [Urine:2380]    LAB:    Recent Labs     21  0306   HGB 10.8*       Wound/Drain Assessment:  Drain:      Dressing:     Physical Exam:  Neurological: no deficit  Incision clean, dry, and intact  5/5 BLE    Assessment:      Patient Active Problem List   Diagnosis Code    Post-op pain G89.18    Spinal stenosis M48.00       Plan:     Continue PT/OT/Rehab  Discontinue: Basilio  Consult: PT  and OT    Discharge To: home with [place] pending progress

## 2021-11-03 NOTE — PROGRESS NOTES
Transition of Care Plan:  RUR: 2% low   Disposition: home with home health- referral sent to [facility] [address]   Follow up appointments: ortho  DME needed: pt owns dme needed for d/c  Transportation at Discharge: family   [address] or means to access home: family to provide         IM Medicare Letter: to be provided prior to d/c  Is patient a BCPI-A Bundle: no         If yes, was Bundle Letter given?:     Caregiver Contact: wife Tegan Bartlett [phone]  Discharge Caregiver contacted prior to discharge? To be contacted prior to dc            Reason for Admission: L4-5 POSTERIOR DECOMPRESSION AND FUSION WITH GLOBUS and L4-5 LATERAL FUSION FROM LEFT SIDED APPROACH                   RUR Score:  2%                    Plan for utilizing home health:  Per recommendation         PCP: First and Last name:  Raul Royal MD   Name of Practice: unable to recall    Are you a current patient: Yes/No: yes   Approximate date of last visit: within last month    Can you participate in a virtual visit with your PCP: yes                    Current Advanced Directive/Advance Care Plan: Benito Groves (ACP) Conversation      Date of Conversation: 11/3/21  Conducted with: Patient with [address]:   No healthcare decision makers have been documented. Click here to complete [address] including selection of the Healthcare Decision Maker Relationship (ie \"Primary\")    Today we documented Decision Maker(s) consistent with ACP documents on file. Content/Action Overview: Has ACP document(s) on file - reflects the patient's care preferences    Healthcare Decision Maker:   Click here to complete [address] including selection of the Healthcare Decision Maker Relationship (ie \"Primary\")                         Transition of Care Plan:                      Pt and wife live in a 2 story home with 5 bertha.  Pt has a RW, cane, and raised toilet seat. At [facility] pt is independent with ADLs and driving. Pt has no hx of HH but has been to outpatient therapy at [facility]. Has Pt's plan is to return home with home health. Pt requested to use [facility] [address] for [facility] services as he received a phone call from them prior to surgery. Referral sent to [facility] [address] and waiting for response. Care Management Interventions  PCP Verified by CM: Yes  Mode of Transport at Discharge:  Other (see comment) (family )  Transition of Care Consult (CM Consult): Discharge Planning, [address]: No  Discharge Durable Medical Equipment: No  Physical Therapy Consult: Yes  Occupational Therapy Consult: Yes  Speech Therapy Consult: No  Support Systems: Spouse/Significant Other, Child(cathleen)  Confirm Follow Up Transport: Family  The Plan for Transition of Care is Related to the Following Treatment Goals : [facility]  The Patient and/or Patient Representative was Provided with a Choice of Provider and Agrees with the Discharge Plan?: Yes  Freedom of Choice List was Provided with Basic Dialogue that Supports the Patient's Individualized Plan of Care/Goals, Treatment Preferences and Shares the Quality Data Associated with the Providers?: Yes  Discharge Location  Discharge Placement: Home with home health    Jocelyn Beckford, [address], [address]

## 2021-11-03 NOTE — PROGRESS NOTES
Problem: Falls - Risk of  Goal: *Absence of Falls  Description: Document Michael Cease Fall Risk and appropriate interventions in the flowsheet.   Outcome: Progressing Towards Goal  Note: Fall Risk Interventions:  Mobility Interventions: Patient to call before getting OOB    Mentation Interventions: Adequate sleep, hydration, pain control    Medication Interventions: Patient to call before getting OOB    Elimination Interventions: Call light in reach    History of Falls Interventions: Door open when patient unattended         Problem: Pain  Goal: *Control of Pain  Outcome: Progressing Towards Goal     Problem: Patient Education: Go to Patient Education Activity  Goal: Patient/Family Education  Outcome: Progressing Towards Goal

## 2021-11-03 NOTE — PROGRESS NOTES
Problem: Mobility Impaired (Adult and Pediatric)  Goal: *Acute Goals and Plan of Care (Insert Text)  Description: FUNCTIONAL STATUS PRIOR TO ADMISSION: Patient was independent and active without use of DME.    HOME SUPPORT PRIOR TO ADMISSION: The patient lived with spouse in 2 sty home with 5 ENOCH and BL railings. Patient son will be visiting to assist    Physical Therapy Goals    Re-evaluated 11/3/2021 following 2nd stage procedure. Goals remain appropriate. Initiated 11/2/2021    1. Patient will move from supine to sit and sit to supine , scoot up and down, and roll side to side in bed with modified independence within 4 days. 2. Patient will perform sit to stand with modified independence within 4 days. 3. Patient will ambulate with supervision/set-up for 100 feet with the least restrictive device within 4 days. 4. Patient will ascend/descend 5 stairs with BL handrail(s) with supervision/set-up within 4 days. 5. Patient will verbalize and demonstrate understanding of spinal precautions (No bending, lifting greater than 5 lbs, or twisting; log-roll technique; frequent repositioning as instructed) within 4 days. 11/3/2021 1633 by BREANNE Gamboa  Outcome: Progressing Towards Goal     PHYSICAL THERAPY TREATMENT  Patient: Jeff De Leon (1 y.o. male)  Date: 11/3/2021  Diagnosis: Post-op pain [G89.18]  Spinal stenosis [M48.00]   <principal problem not specified>  Procedure(s) (LRB):  L4-5 POSTERIOR DECOMPRESSION AND FUSION WITH GLOBUS (Left) 1 Day Post-Op  Precautions: Spinal No bending, no lifting greater than 5 lbs, no twisting, log-roll technique, repositioning every 20-30 min except when sleeping, brace when OOB (if ordered)  Chart, physical therapy assessment, plan of care and goals were reviewed. ASSESSMENT  Patient continues with skilled PT services and is progressing towards goals. Received in supine w/ spouse and son present, agreeable to PT.   Patient continues to require cues to slow overall mobility, especially during gait training. Completed supine>sit w/ log roll technique using CGA, able to maintain spinal precautions w/o cues. Performed multiple sit<>stands throughout session with Pedro-CGA, initial sit>stand patient with increased trunk flexion and needing additional time to come to full stand, but able to improve technique after multiple transfers. Requires repeated cues and education to both patient and familly for hand placement when completing transfers. Ambulated in 2 trials with RW and Pedro, 1x60ft and 1x20ft. Patient is very unsteady during dynamic standing and ambulation. He has a posterior postural sway and mild scissoring especially when turning, requiring anterior directed support by therapist and verbal cues to correct. Patient is [place], requiring visual and tactile cues. Family and patient asking numerous questions about home set-up and therapist providing education about maintaining safe environment for discharge. Patient returned to chair w/o back brace as patient has not been given one but NP/Physician OK for him to sit in chair. Did review wear schedule to patient and family once he receives brace. Patient continues to be below PLOF and require continued skilled acute PT services at discharge. Recommend HH PT at discharge. Will need RW     Current Level of Function Impacting Discharge (mobility/balance): bed mob CGA; transfers and gait Pedro    Other factors to consider for discharge: not given back brace; 5 ENOCH; car transfer         PLAN :  Patient continues to benefit from skilled intervention to address the above impairments. Continue treatment per established plan of care. to address goals.     Recommendation for discharge: (in order for the patient to meet his/her long term goals)  Physical therapy at least 2 days/week in the home AND ensure assist and/or supervision for safety with stair negotiation    This discharge recommendation:  Has been made in collaboration with the attending provider and/or case management    IF patient discharges home will need the following DME: rolling walker       SUBJECTIVE:   Patient stated I'm not sure what my pain will do when I get up? Nayeli Ray    OBJECTIVE DATA SUMMARY:   Critical Behavior:  Neurologic State: Alert  Orientation Level: Oriented X4, Appropriate for age  Cognition: Appropriate decision making  Safety/Judgement: Awareness of environment    Spinal diagnosis intervention:  Reviewed back brace application and wear schedule. Functional Mobility Training:  Bed Mobility:  Log Rolling: Contact guard assistance  Supine to Sit: Contact guard assistance  Sit to Supine: Supervision  Scooting: Supervision  Transfers:  Sit to Stand: Minimum assistance; Contact guard assistance (increased trunk flexion)  Stand to Sit: Contact guard assistance  Bed to Chair: Minimum assistance  Balance:  Sitting: Impaired  Sitting - Static: Good (unsupported)  Sitting - Dynamic: Fair (occasional)  Standing: Impaired; With support  Standing - Static: Fair; Constant support  Standing - Dynamic : Fair; Constant support  Ambulation/Gait Training:  Distance (ft): 80 Feet (ft) (1x60; 1x20)  Assistive Device: Gait belt; Walker, rolling  Ambulation - Level of Assistance: Minimal assistance  Gait Description (WDL): Exceptions to WDL  Gait Abnormalities:  (mild scissoring w/ turns)  Base of Support: Widened  Speed/Donna: Pace decreased (<100 feet/min)  Step Length: Left shortened; Right shortened    Pain Ratin/10 at rest    Activity Tolerance:   Fair, tolerates ADLs without rest breaks and SpO2 stable on RA    After treatment patient left in no apparent distress:   Sitting in chair, Call bell within reach and Caregiver / family present    COMMUNICATION/COLLABORATION:   The patients plan of care was discussed with: Registered nurse.      BREANNE Bright   Time Calculation: 24 mins
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
## 2021-11-04 VITALS
RESPIRATION RATE: 18 BRPM | TEMPERATURE: 98.6 F | WEIGHT: 229.5 LBS | SYSTOLIC BLOOD PRESSURE: 121 MMHG | DIASTOLIC BLOOD PRESSURE: 57 MMHG | BODY MASS INDEX: 29.45 KG/M2 | HEIGHT: 74 IN | OXYGEN SATURATION: 95 % | HEART RATE: 76 BPM

## 2021-11-04 PROCEDURE — 74011250637 HC RX REV CODE- 250/637: Performed by: PHYSICIAN ASSISTANT

## 2021-11-04 PROCEDURE — L0627 LO SAG RI AN/POS PNL PRE CST: HCPCS

## 2021-11-04 PROCEDURE — 97530 THERAPEUTIC ACTIVITIES: CPT | Performed by: PHYSICAL THERAPIST

## 2021-11-04 PROCEDURE — 51798 US URINE CAPACITY MEASURE: CPT

## 2021-11-04 PROCEDURE — 97535 SELF CARE MNGMENT TRAINING: CPT

## 2021-11-04 PROCEDURE — 97116 GAIT TRAINING THERAPY: CPT | Performed by: PHYSICAL THERAPIST

## 2021-11-04 RX ADMIN — OXYCODONE 5 MG: 5 TABLET ORAL at 05:30

## 2021-11-04 RX ADMIN — OXYCODONE 5 MG: 5 TABLET ORAL at 00:15

## 2021-11-04 RX ADMIN — POLYETHYLENE GLYCOL 3350 17 G: 17 POWDER, FOR SOLUTION ORAL at 09:38

## 2021-11-04 RX ADMIN — ACETAMINOPHEN 1000 MG: 500 TABLET ORAL at 00:15

## 2021-11-04 RX ADMIN — OXYCODONE 5 MG: 5 TABLET ORAL at 11:51

## 2021-11-04 RX ADMIN — Medication 1 AMPULE: at 09:41

## 2021-11-04 RX ADMIN — ACETAMINOPHEN 1000 MG: 500 TABLET ORAL at 05:30

## 2021-11-04 RX ADMIN — DOCUSATE SODIUM 50MG AND SENNOSIDES 8.6MG 1 TABLET: 8.6; 5 TABLET, FILM COATED ORAL at 09:40

## 2021-11-04 RX ADMIN — ACETAMINOPHEN 1000 MG: 500 TABLET ORAL at 13:12

## 2021-11-04 RX ADMIN — PSYLLIUM HUSK 1 PACKET: 3.4 POWDER ORAL at 09:38

## 2021-11-04 RX ADMIN — THERA TABS 1 TABLET: TAB at 09:41

## 2021-11-04 RX ADMIN — PANTOPRAZOLE SODIUM 40 MG: 40 TABLET, DELAYED RELEASE ORAL at 05:31

## 2021-11-04 RX ADMIN — PHENYTOIN SODIUM 200 MG: 100 CAPSULE ORAL at 09:40

## 2021-11-04 NOTE — PROGRESS NOTES
Reviewed discharge instructions with pt including follow-up appointments, new medications and side effects, medications to continue, medications to discontinue (meloxicam) education, and MyChart information. PT expressed understanding. IV was removed.

## 2021-11-04 NOTE — DISCHARGE SUMMARY
Spine Discharge Summary    Patient ID:  Neno De La Rosa  [id number]  male  66 y.o.  1945    Admit date: 11/1/2021    Discharge date: 11/4/2021    Admitting Physician: Dayna Pope MD     Consulting Physician(s):   Treatment Team: Attending Provider: Shaq Miles MD; Utilization Review: Kylee Kim RN; Care Manager: Dakotah Gonzalez Primary Nurse: Scott Howard RN    Date of Surgery:   11/2/2021     Preoperative Diagnosis:  LUMBOSACRAL RADICULOPATHY, LUMBAR SPINE PAIN, BILATERAL SCIATICA, SPONDYLOLISTHESIS, LUMBAR REGION, SPINAL STENOSIS LUMBAR REGION, WITH NEUROGENIC CLAUDICATION, FORAMINAL STENOSIS OF LUMBAR REGION, WEAKNESS OF BOTH LOWER EXTREMITIES    Postoperative Diagnosis:   LUMBOSACRAL RADICULOPATHY, LUMBAR SPINE PAIN, BILATERAL SCIATICA, SPONDYLOLISTHESIS, LUMBAR REGION, SPINAL STENOSIS LUMBAR REGION, WITH NEUROGENIC CLAUDICATION, FORAMINAL STENOSIS OF LUMBAR REGION, WEAKNESS OF BOTH LOWER EXTREMITIES    Procedure(s):  L4-5 POSTERIOR DECOMPRESSION AND FUSION WITH GLOBUS     Anesthesia Type:   General     Surgeon: Shaq Miles MD                            HPI:  Pt is a 66 y.o. male who has a history of LUMBOSACRAL RADICULOPATHY, LUMBAR SPINE PAIN, BILATERAL SCIATICA, SPONDYLOLISTHESIS, LUMBAR REGION, SPINAL STENOSIS LUMBAR REGION, WITH NEUROGENIC CLAUDICATION, FORAMINAL STENOSIS OF LUMBAR REGION, WEAKNESS OF BOTH LOWER EXTREMITIES  with pain and limitations of activities of daily living who presents at this time for a L4-5 POSTERIOR DECOMPRESSION AND FUSION WITH GLOBUS  following the failure of conservative management. PMH:   Past Medical History:   Diagnosis Date    Arthritis     Epilepsy ([facility] [address] 1964    [facility] head [facility], no seizure since 1975    Essential hypertension     GERD (gastroesophageal reflux disease)     Skin cancer     basal       Body mass index is 29.87 kg/m². : A BMI > 30 is classified as obesity and > 40 is classified as morbid obesity. Medications upon admission :   Prior to Admission Medications   Prescriptions Last Dose Informant Patient Reported? Taking? Omeprazole delayed release (PRILOSEC D/R) 20 mg tablet 10/30/2021  Yes No   Sig: Take 20 mg by mouth daily. cholecalciferol (Vitamin D3) (5000 Units/125 mcg) tab tablet 10/30/2021  Yes No   Sig: Take 5,000 Units by mouth nightly. irbesartan (AVAPRO) 150 mg tablet 10/31/2021 at Unknown time  Yes Yes   Sig: Take 150 mg by mouth nightly. meloxicam (MOBIC) 15 mg tablet 10/30/2021  Yes No   Sig: Take 15 mg by mouth daily. As needed   multivit-min/FA/lycopen/lutein (CENTRUM SILVER MEN PO) 10/30/2021  Yes No   Sig: Take 2 Tablets by mouth two (2) times a day. phenytoin ER (DILANTIN ER) 100 mg ER capsule 11/1/2021 at 1000  Yes Yes   Sig: Take 200 mg by mouth two (2) times a day. psyllium (METAMUCIL) powd 10/30/2021  Yes No   Sig: Take 1 Capsule by mouth two (2) times a day. Facility-Administered Medications: None        Allergies:  No Known Allergies     Hospital Course: The patient underwent surgery. Complications:  None; patient tolerated the procedure well. Was taken to the PACU in stable condition and then transferred to the ortho floor. Perioperative Antibiotics:  Ancef     Postoperative Pain Management:  Oxycodone & Tylenol     Postoperative transfusions:    Number of units banked PRBCs =   none     Post Op complications: none    Hemoglobin at discharge:    Lab Results   Component Value Date/Time    HGB 10.8 (L) 11/03/2021 03:06 AM    INR 1.1 10/25/2021 02:32 PM       Dressing changed on POD#2 - optifoam applied for discharge - clean, dry and intact. No significant erythema or swelling. Wound appears to be healing without any evidence of infection. Neurovascular exam found to be within normal limits. Physical Therapy started following surgery and participated in bed mobility, transfers and ambulation.         Gait:  Gait  Base of Support: Center of gravity altered, Narrowed  Speed/Donna: Pace decreased (<100 feet/min), Slow  Step Length: Left shortened, Right shortened  Gait Abnormalities:  (mild scissoring w/ turns)  Ambulation - Level of Assistance: Contact guard assistance, Assist x1  Distance (ft): 100 Feet (ft)  Assistive Device: Gait belt, Walker, rolling  Rail Use: Both  Stairs - Level of Assistance: Contact guard assistance  Number of Stairs Trained: 4                   Discharged to: Home with HH. Condition on Discharge:   stable    Discharge instructions:  - Take pain medications as prescribed  - Resume pre hospital diet      - Discharge activity: activity as tolerated  - Ambulate as tolerated  - Lumbar brace when oob  - Avoid bending, lifting and twisting  - Wound Care Keep wound clean and dry. See discharge instruction sheet. -DISCHARGE MEDICATION LIST     Current Discharge Medication List      START taking these medications    Details   acetaminophen (TYLENOL) 500 mg tablet Take 2 Tablets by mouth every six (6) hours for 7 days. Qty: 56 Tablet, Refills: 0  Start date: 11/3/2021, End date: 11/10/2021      oxyCODONE IR (ROXICODONE) 5 mg immediate release tablet Take 1-2 Tablets by mouth every four (4) hours as needed for Pain for up to 7 days. Max Daily Amount: 60 mg.  Qty: 30 Tablet, Refills: 0  Start date: 11/3/2021, End date: 11/10/2021    Associated Diagnoses: S/P lumbar spinal fusion      polyethylene glycol (MIRALAX) 17 gram packet Take 1 Packet by mouth daily for 7 days. Qty: 7 Packet, Refills: 0  Start date: 11/4/2021, End date: 11/11/2021      senna-docusate (PERICOLACE) 8.6-50 mg per tablet Take 1 Tablet by mouth two (2) times a day for 7 days. Qty: 14 Tablet, Refills: 0  Start date: 11/3/2021, End date: 11/10/2021         CONTINUE these medications which have NOT CHANGED    Details   phenytoin ER (DILANTIN ER) 100 mg ER capsule Take 200 mg by mouth two (2) times a day.     Associated Diagnoses: Basal cell carcinoma of left forehead irbesartan (AVAPRO) 150 mg tablet Take 150 mg by mouth nightly. Associated Diagnoses: Basal cell carcinoma of left forehead      multivit-min/FA/lycopen/lutein (CENTRUM SILVER MEN PO) Take 2 Tablets by mouth two (2) times a day. cholecalciferol (Vitamin D3) (5000 Units/125 mcg) tab tablet Take 5,000 Units by mouth nightly. psyllium (METAMUCIL) powd Take 1 Capsule by mouth two (2) times a day. Omeprazole delayed release (PRILOSEC D/R) 20 mg tablet Take 20 mg by mouth daily.     Associated Diagnoses: Basal cell carcinoma of left forehead         STOP taking these medications       meloxicam (MOBIC) 15 mg tablet Comments:   Reason for Stopping:            per medical continuation form      -Follow up in office in 2 weeks      Signed:  Graciela Spangler NP  Orthopaedic Nurse Practitioner    11/4/2021  10:41 AM

## 2021-11-04 NOTE — PROGRESS NOTES
ORTHO POST OP SPINE PROGRESS NOTE    2021  Admit Date: 2021  Admit Diagnosis: Post-op pain [G89.18]  Spinal stenosis [M48.00]  Procedure: Procedure(s):  L4-5 POSTERIOR DECOMPRESSION AND FUSION WITH GLOBUS  Post Op day: 2 Days Post-Op    Subjective:     Bahman Holcomb is a patient who has complaints of low back pain. no leg pain s/p L4-5 lat and post fusion staged. main c/o preop was fatigue in legs when walking. some burning pain in L foot, both feel improved. biggest issue post op has been transfers laying to sitting or sitting to standing. states his son is in from [place] [phone] for the next 3 weeks to assist at home. .   Tolerating po  + voiding  Review of Systems: Pertinent items are noted in HPI. Objective:     PT/OT:   Distance Ambulated:           Time Ambulated (min):        Ambulation Response: Activity Response: Fairly tolerated  Assistive Device:              Assistive Device: Fall prevention device    Vital Signs:    Blood pressure 130/72, pulse 74, temperature 98.5 °F (36.9 °C), resp. rate 18, height 6' 1.5\" (1.867 m), weight 104.1 kg (229 lb 8 oz), SpO2 95 %. Temp (24hrs), Av °F (37.2 °C), Min:98.4 °F (36.9 °C), Max:99.5 °F (37.5 °C)      No intake/output data recorded.  1901 -  0700  In: -   Out: 2309 [Urine:3210]    LAB:    Recent Labs     21  0306   HGB 10.8*       Wound/Drain Assessment:  Drain:      Dressing:     Physical Exam:  Neurological: no deficit  Incision clean, dry, and intact  5/5 BLE    Assessment:      Patient Active Problem List   Diagnosis Code    Post-op pain G89.18    Spinal stenosis M48.00       Plan:     Continue PT/OT/Rehab  Discontinue: IV  Consult: PT  and OT    Discharge To: home with [facility] and family assistance.  Possibly today

## 2021-11-04 NOTE — PROGRESS NOTES
Problem: Mobility Impaired (Adult and Pediatric)  Goal: *Acute Goals and Plan of Care (Insert Text)  Description: FUNCTIONAL STATUS PRIOR TO ADMISSION: Patient was independent and active without use of DME.    HOME SUPPORT PRIOR TO ADMISSION: The patient lived with spouse in 2 sty home with 5 ENOCH and BL railings. Patient son will be visiting to assist    Physical Therapy Goals    Re-evaluated 11/3/2021 following 2nd stage procedure. Goals remain appropriate. Initiated 11/2/2021    1. Patient will move from supine to sit and sit to supine , scoot up and down, and roll side to side in bed with modified independence within 4 days. 2. Patient will perform sit to stand with modified independence within 4 days. 3. Patient will ambulate with supervision/set-up for 100 feet with the least restrictive device within 4 days. 4. Patient will ascend/descend 5 stairs with BL handrail(s) with supervision/set-up within 4 days. 5. Patient will verbalize and demonstrate understanding of spinal precautions (No bending, lifting greater than 5 lbs, or twisting; log-roll technique; frequent repositioning as instructed) within 4 days. Outcome: Progressing Towards Goal   PHYSICAL THERAPY TREATMENT  Patient: Jorge Gamboa (51 y.o. male)  Date: 11/4/2021  Diagnosis: Post-op pain [G89.18]  Spinal stenosis [M48.00]   <principal problem not specified>  Procedure(s) (LRB):  L4-5 POSTERIOR DECOMPRESSION AND FUSION WITH GLOBUS (Left) 2 Days Post-Op  Precautions: Spinal No bending, no lifting greater than 5 lbs, no twisting, log-roll technique, repositioning every 20-30 min except when sleeping, brace when OOB (if ordered)  Chart, physical therapy assessment, plan of care and goals were reviewed. ASSESSMENT  Patient continues with skilled PT services and is progressing towards goals. Patient limited by pain and gait instability. Patient without any overt LOB during session but is generally unstable with gait instability.   Needing supervision for log roll and CGA for transfers. Amb approx 120 feet with RW and CGA with no overt LOB but generally upstable. Able to up/down stairs with CGA and B rails and demo's safe car transfer. Patient is safe to DC home from a mobility standpoint and would benefit from [facility] PT follow up. Patient is cleared for discharge from PT standpoint:  YES [x]     NO []         Other factors to consider for discharge: at risk for falls, below baseline         PLAN :  Patient continues to benefit from skilled intervention to address the above impairments. Continue treatment per established plan of care. to address goals. Recommendation for discharge: (in order for the patient to meet his/her long term goals)  Physical therapy at least 2 days/week in the home AND ensure assist and/or supervision for safety with mobility      IF patient discharges home will need the following DME: at risk for falls, below baseline       SUBJECTIVE:   Patient stated I'm doing better.     OBJECTIVE DATA SUMMARY:   Critical Behavior:  Neurologic State: Alert  Orientation Level: Oriented X4  Cognition: Follows commands  Safety/Judgement: Awareness of environment    Spinal diagnosis intervention:  The patient stated 3/3 back precautions when prompted. Reviewed all 3 back precautions, log roll technique, and sitting for 30 minutes at a time. Functional Mobility Training:    Bed Mobility:  Log Rolling: Supervision  Supine to Sit: Supervision     Scooting: Independent        Transfers:  Sit to Stand: Contact guard assistance  Stand to Sit: Contact guard assistance                             Balance:  Sitting: Intact  Standing: Impaired  Standing - Static: Constant support; Good  Standing - Dynamic : Constant support;  Fair  Ambulation/Gait Training:  Distance (ft): 100 Feet (ft)  Assistive Device: Gait belt; Walker, rolling  Ambulation - Level of Assistance: Contact guard assistance; Assist x1                 Base of Support: Center of gravity altered; Narrowed     Speed/Donna: Pace decreased (<100 feet/min); Slow  Step Length: Left shortened; Right shortened                    Stairs:  Number of Stairs Trained: 4  Stairs - Level of Assistance: Contact guard assistance   Rail Use: Both    Pain Rating:  Reports pain but does not rate    Activity Tolerance:   Good    After treatment patient left in no apparent distress:   Sitting in chair and Call bell within reach    COMMUNICATION/COLLABORATION:   The patients plan of care was discussed with: Physical therapist, Occupational therapist, and Registered nurse.      Edyta Coombs PT, DPT   Time Calculation: 26 mins

## 2021-11-04 NOTE — PROGRESS NOTES
Bedside and Verbal shift change report given to Jovani KWOK RN (oncoming nurse) by MARÍA Garcia RN (offgoing nurse). Report given with SBAR, Kardex, Intake/Output, MAR and Recent Results.

## 2021-11-04 NOTE — PROGRESS NOTES
Problem: Self Care Deficits Care Plan (Adult)  Goal: *Acute Goals and Plan of Care (Insert Text)  Description: FUNCTIONAL STATUS PRIOR TO ADMISSION: Patient was independent and active without use of DME.    HOME SUPPORT: The patient lived with wife but did not require assist.    Occupational Therapy Goals  Initiated 11/2/2021    1. Patient will perform lower body dressing with modified independence using Reacher, Stocking Aid, Long [employer], and Dressing Stick PRN within 7 days. 2.  Patient will perform toileting with modified independence using most appropriate DME within 7 days. 3.  Patient will grooming at independence within 7 days. 4.  Patient will don/doff back brace at modified independence within 7 days. 5.  Patient will verbalize/demonstrate 3/3 back precautions during ADL tasks without cues within 7 days. Outcome: Progressing Towards Goal     OCCUPATIONAL THERAPY TREATMENT  Patient: Cira Riley (86 y.o. male)  Date: 11/4/2021  Diagnosis: Post-op pain [G89.18]  Spinal stenosis [M48.00]   <principal problem not specified>  Procedure(s) (LRB):  L4-5 POSTERIOR DECOMPRESSION AND FUSION WITH GLOBUS (Left) 2 Days Post-Op  Precautions: Spinal  Chart, occupational therapy assessment, plan of care, and goals were reviewed. ASSESSMENT  Patient continues with skilled OT services and is progressing towards goals. Pt rc'd supine in bed agreeable to session, able to recall 3/3 back prec. Pt seen on this date to practice adaptive LB dressing and donning/dofing LSO. Pt with improved log rolling, however still uses bed rail. Educated pt on hand placement when bed rail is not available. Once seated EOB, pt educated on LSO and technique to don without breaking back prec. Pt able to manage LSO straps/velcro with verbal cues and SPV. Following pt completed STS wth increased time to ascend and bedroom mobility > WC at doorway using RW with overall CGA.  Pt with improved understanding and carryover of adaptive dressing techniques and understanding of back prec compared to yesterday. Continue to recommend [facility]OT at [facility] with 24/7 family assist.     Current Level of Function Impacting Discharge (ADLs): SPV    Other factors to consider for discharge: Pt has family support at home, independent PLOF         PLAN :  Patient continues to benefit from skilled intervention to address the above impairments. Continue treatment per established plan of care to address goals. Recommend with staff: Shu Sanchez for all meals, assist x1 w/ Rw > bathroom     Recommend next OT session: toileting, standing grooming    Recommendation for discharge: (in order for the patient to meet his/her long term goals)  Occupational therapy at least 2 days/week in the home     This discharge recommendation:  Has been made in collaboration with the attending provider and/or case management    IF patient discharges home will need the following DME: patient owns DME required for discharge       SUBJECTIVE:   Patient stated Im glad I finally got my brace.     OBJECTIVE DATA SUMMARY:   Cognitive/Behavioral Status:  Neurologic State: Alert  Orientation Level: Oriented X4  Cognition: Follows commands             Functional Mobility and Transfers for ADLs:  Bed Mobility:  Rolling: Supervision  Supine to Sit: Supervision  Scooting: Independent    Transfers:  Sit to Stand: Contact guard assistance          Balance:  Sitting: Intact  Standing: Impaired  Standing - Static: Constant support; Good  Standing - Dynamic : Constant support;  Fair    ADL Intervention:                      Upper Body Dressing Assistance  Dressing Assistance: Supervision  Orthotics(Brace): Supervision (verbal cues)  Shirt simulation with hospital gown: Minimum  assistance    Lower Body Dressing Assistance  Slip on Shoes with Back: Supervision (via tailor sit technique)  Position Performed: Seated edge of bed  Cues: Verbal cues provided              Pain:  Pt did not endorse pain during session    Activity Tolerance:   Good    After treatment patient left in no apparent distress:   Seated in WC with PT present in prep to transport > therapy gym to attempt stairs     COMMUNICATION/COLLABORATION:   The patients plan of care was discussed with: Physical therapist, Occupational therapist and Registered nurse.      Dominick Shell OT  Time Calculation: 14 mins

## 2021-11-04 NOTE — PROGRESS NOTES
Patient clear for d/c from CM standpoint     Transition of Care Plan:  RUR: 2% low   Disposition: home with home health- [facility]   Follow up appointments: ortho  DME needed: pt owns dme needed for d/c  Transportation at Discharge: family   [address] or means to access home: family to provide         IM Medicare Letter: to be provided prior to d/c  Is patient a BCPI-A Bundle: no                    If yes, was Bundle Letter given?:     Caregiver Contact: wife Kandace Judy [phone]  Discharge Caregiver contacted prior to discharge? Pt would like to contact patient himself     Update  CM acknowledged d/c order. Patient signed [facility] for [facility]. Medicare pt has received, reviewed, and signed 2nd IM letter informing them of their right to appeal the discharge. Signed copy has been placed on pt bedside chart. Original note  [facility] has accepted patient for [facility] services. AVS updated.      Gregg Grey, [address]

## 2022-02-24 ENCOUNTER — TRANSCRIBE ORDER (OUTPATIENT)
Dept: SCHEDULING | Age: 77
End: 2022-02-24

## 2022-02-24 DIAGNOSIS — M43.16 SPONDYLOLISTHESIS OF LUMBAR REGION: ICD-10-CM

## 2022-02-24 DIAGNOSIS — M54.50 LUMBAR SPINE PAIN: Primary | ICD-10-CM

## 2022-02-24 DIAGNOSIS — M48.062 SPINAL STENOSIS, LUMBAR REGION, WITH NEUROGENIC CLAUDICATION: ICD-10-CM

## 2022-02-24 DIAGNOSIS — M54.17 LUMBOSACRAL RADICULOPATHY: ICD-10-CM

## 2022-02-24 DIAGNOSIS — M54.31 BILATERAL SCIATICA: ICD-10-CM

## 2022-02-24 DIAGNOSIS — M48.061 FORAMINAL STENOSIS OF LUMBAR REGION: ICD-10-CM

## 2022-02-24 DIAGNOSIS — G60.9 PERIPHERAL NEUROPATHY, IDIOPATHIC: ICD-10-CM

## 2022-02-24 DIAGNOSIS — M54.32 BILATERAL SCIATICA: ICD-10-CM

## 2022-03-10 ENCOUNTER — HOSPITAL ENCOUNTER (OUTPATIENT)
Dept: MRI IMAGING | Age: 77
Discharge: HOME | End: 2022-03-10
Attending: ORTHOPAEDIC SURGERY
Payer: MEDICARE

## 2022-03-10 VITALS — BODY MASS INDEX: 29.54 KG/M2 | WEIGHT: 227 LBS

## 2022-03-10 DIAGNOSIS — M48.062 SPINAL STENOSIS, LUMBAR REGION, WITH NEUROGENIC CLAUDICATION: ICD-10-CM

## 2022-03-10 DIAGNOSIS — M48.061 FORAMINAL STENOSIS OF LUMBAR REGION: ICD-10-CM

## 2022-03-10 DIAGNOSIS — M43.16 SPONDYLOLISTHESIS OF LUMBAR REGION: ICD-10-CM

## 2022-03-10 DIAGNOSIS — M54.31 BILATERAL SCIATICA: ICD-10-CM

## 2022-03-10 DIAGNOSIS — M54.50 LUMBAR SPINE PAIN: ICD-10-CM

## 2022-03-10 DIAGNOSIS — M54.17 LUMBOSACRAL RADICULOPATHY: ICD-10-CM

## 2022-03-10 DIAGNOSIS — G60.9 PERIPHERAL NEUROPATHY, IDIOPATHIC: ICD-10-CM

## 2022-03-10 DIAGNOSIS — M54.32 BILATERAL SCIATICA: ICD-10-CM

## 2022-03-10 PROCEDURE — 72141 MRI NECK SPINE W/O DYE: CPT

## 2022-03-10 PROCEDURE — 72158 MRI LUMBAR SPINE W/O & W/DYE: CPT

## 2022-03-10 PROCEDURE — A9576 INJ PROHANCE MULTIPACK: HCPCS | Performed by: ORTHOPAEDIC SURGERY

## 2022-03-10 PROCEDURE — 74011250636 HC RX REV CODE- 250/636: Performed by: ORTHOPAEDIC SURGERY

## 2022-03-10 RX ADMIN — GADOTERIDOL 20 ML: 279.3 INJECTION, SOLUTION INTRAVENOUS at 11:23

## 2022-03-18 PROBLEM — M48.00 SPINAL STENOSIS: Status: ACTIVE | Noted: 2021-11-02

## 2022-03-18 PROBLEM — G89.18 POST-OP PAIN: Status: ACTIVE | Noted: 2021-11-01

## 2023-01-13 ENCOUNTER — TRANSCRIBE ORDER (OUTPATIENT)
Dept: SCHEDULING | Age: 78
End: 2023-01-13

## 2023-01-13 DIAGNOSIS — M47.812 CERVICAL SPONDYLOSIS WITHOUT MYELOPATHY: ICD-10-CM

## 2023-01-13 DIAGNOSIS — R29.898 WEAKNESS OF BOTH LOWER EXTREMITIES: ICD-10-CM

## 2023-01-13 DIAGNOSIS — M54.2 CERVICALGIA: Primary | ICD-10-CM

## 2023-01-13 DIAGNOSIS — M47.26 OSTEOARTHRITIS OF SPINE WITH RADICULOPATHY, LUMBAR REGION: ICD-10-CM

## 2023-01-13 DIAGNOSIS — M48.062 SPINAL STENOSIS, LUMBAR REGION, WITH NEUROGENIC CLAUDICATION: ICD-10-CM

## 2023-01-13 DIAGNOSIS — M48.02 SPINAL STENOSIS IN CERVICAL REGION: ICD-10-CM

## 2023-01-13 DIAGNOSIS — M43.12 SPONDYLOLISTHESIS OF CERVICAL REGION: ICD-10-CM

## 2023-01-13 DIAGNOSIS — M43.16 SPONDYLOLISTHESIS, LUMBAR REGION: ICD-10-CM

## 2023-01-13 DIAGNOSIS — G60.9 PERIPHERAL NEUROPATHY, IDIOPATHIC: ICD-10-CM

## 2023-01-13 DIAGNOSIS — M54.17 LUMBOSACRAL RADICULOPATHY: ICD-10-CM

## 2023-01-13 DIAGNOSIS — M54.32 BILATERAL SCIATICA: ICD-10-CM

## 2023-01-13 DIAGNOSIS — M48.061 FORAMINAL STENOSIS OF LUMBAR REGION: ICD-10-CM

## 2023-01-13 DIAGNOSIS — M54.31 BILATERAL SCIATICA: ICD-10-CM

## 2023-01-13 DIAGNOSIS — M54.50 LUMBAR SPINE PAIN: ICD-10-CM

## 2023-04-24 ENCOUNTER — HOSPITAL ENCOUNTER (OUTPATIENT)
Dept: MRI IMAGING | Age: 78
Discharge: HOME | End: 2023-04-24
Attending: ORTHOPAEDIC SURGERY
Payer: MEDICARE

## 2023-04-24 DIAGNOSIS — M48.02 SPINAL STENOSIS IN CERVICAL REGION: ICD-10-CM

## 2023-04-24 DIAGNOSIS — M48.062 SPINAL STENOSIS, LUMBAR REGION, WITH NEUROGENIC CLAUDICATION: ICD-10-CM

## 2023-04-24 DIAGNOSIS — R29.898 WEAKNESS OF BOTH LOWER EXTREMITIES: ICD-10-CM

## 2023-04-24 DIAGNOSIS — M47.812 CERVICAL SPONDYLOSIS WITHOUT MYELOPATHY: ICD-10-CM

## 2023-04-24 DIAGNOSIS — M48.061 FORAMINAL STENOSIS OF LUMBAR REGION: ICD-10-CM

## 2023-04-24 DIAGNOSIS — G60.9 PERIPHERAL NEUROPATHY, IDIOPATHIC: ICD-10-CM

## 2023-04-24 DIAGNOSIS — M43.12 SPONDYLOLISTHESIS OF CERVICAL REGION: ICD-10-CM

## 2023-04-24 DIAGNOSIS — M54.50 LUMBAR SPINE PAIN: ICD-10-CM

## 2023-04-24 DIAGNOSIS — M54.17 LUMBOSACRAL RADICULOPATHY: ICD-10-CM

## 2023-04-24 DIAGNOSIS — M47.26 OSTEOARTHRITIS OF SPINE WITH RADICULOPATHY, LUMBAR REGION: ICD-10-CM

## 2023-04-24 DIAGNOSIS — M54.2 CERVICALGIA: ICD-10-CM

## 2023-04-24 DIAGNOSIS — M54.31 BILATERAL SCIATICA: ICD-10-CM

## 2023-04-24 DIAGNOSIS — M54.32 BILATERAL SCIATICA: ICD-10-CM

## 2023-04-24 DIAGNOSIS — M43.16 SPONDYLOLISTHESIS, LUMBAR REGION: ICD-10-CM

## 2023-04-24 PROCEDURE — 72148 MRI LUMBAR SPINE W/O DYE: CPT

## 2023-04-24 PROCEDURE — 72141 MRI NECK SPINE W/O DYE: CPT

## (undated) DEVICE — Device

## (undated) DEVICE — KNIFE SURG BAYNT 193 CM 1 PC ANNULOTOMY SS TRANSCONTINENTAL

## (undated) DEVICE — DRILL BIT, 3.5MM

## (undated) DEVICE — LAMINECTOMY-MRMC: Brand: MEDLINE INDUSTRIES, INC.

## (undated) DEVICE — DRAPE,CHEST,FENES,15X10,STERIL: Brand: MEDLINE

## (undated) DEVICE — SUTURE STRATAFIX SPRL MCRYL + SZ 2-0 L18IN ABSRB UD CT-1 SXMP1B413

## (undated) DEVICE — SUTURE V-LOC 180 SZ 0 L12IN ABSRB GRN L37MM GS-21 1/2 CIR VLOCL0316

## (undated) DEVICE — GARMENT,MEDLINE,DVT,INT,CALF,MED, GEN2: Brand: MEDLINE

## (undated) DEVICE — PREP SKN CHLRAPRP APL 26ML STR --

## (undated) DEVICE — 3M™ TEGADERM™ TRANSPARENT FILM DRESSING FRAME STYLE, 1626W, 4 IN X 4-3/4 IN (10 CM X 12 CM), 50/CT 4CT/CASE: Brand: 3M™ TEGADERM™

## (undated) DEVICE — DRAPE,LAP,CHOLE,W/TROUGHS,STERILE: Brand: MEDLINE

## (undated) DEVICE — STERILE POLYISOPRENE POWDER-FREE SURGICAL GLOVES: Brand: PROTEXIS

## (undated) DEVICE — DISSECTOR LAP DIA5MM BLNT TIP ENDOPATH

## (undated) DEVICE — SYR 20ML LL STRL LF --

## (undated) DEVICE — GLOVE SURG SZ 85 L12IN FNGR THK79MIL GRN LTX FREE

## (undated) DEVICE — SUTURE VCRL SZ 1 L18IN ABSRB VLT CT-1 L36MM 1/2 CIR J741D

## (undated) DEVICE — BNDG ADH FABRIC 2X4IN ST LF --

## (undated) DEVICE — GLOVE ORANGE PI 7 1/2   MSG9075

## (undated) DEVICE — NEEDLE BX 11GA L152MM STREAMLINED SUP SHRP T HNDL TRCR TAPR

## (undated) DEVICE — KIT JACK TBL PT CARE

## (undated) DEVICE — RETRACTOR KIT FOR INTERCONTINENTAL PLATE SPACER SYS MARS 3V

## (undated) DEVICE — TRANSFER SET 3": Brand: MEDLINE INDUSTRIES, INC.

## (undated) DEVICE — SUTURE VCRL SZ 2-0 L18IN ABSRB UD CT-1 L36MM 1/2 CIR J839D

## (undated) DEVICE — SPONGE GZ W4XL4IN COT 12 PLY TYP VII WVN C FLD DSGN

## (undated) DEVICE — TRAP FLUID BUFFALO FLTR

## (undated) DEVICE — ELECTRODE BLDE L4IN NONINSULATED EDGE

## (undated) DEVICE — GLOVE SURG SZ 75 L12IN FNGR THK79MIL GRN LTX FREE

## (undated) DEVICE — DRAPE C ARM DISP FOR EXCELSIUSGPS ROBOTIC NAVIGATION PLATFRM

## (undated) DEVICE — TOTAL TRAY, 16FR 10ML SIL FOLEY, URN: Brand: MEDLINE

## (undated) DEVICE — NDL SPNE QNCKE 18GX3.5IN LF --

## (undated) DEVICE — C-ARMOR C-ARM EQUIPMENT COVERS CLEAR STERILE UNIVERSAL FIT 12 PER CASE: Brand: C-ARMOR

## (undated) DEVICE — MARS 3VL KIT

## (undated) DEVICE — FLOSEAL MATRIX IS INDICATED IN SURGICAL PROCEDURES (OTHER THAN IN OPHTHALMIC) AS AN ADJUNCT TO HEMOSTASIS WHEN CONTROL OF BLEEDING BY LIGATURE OR CONVENTIONALPROCEDURES IS INEFFECTIVE OR IMPRACTICAL.: Brand: FLOSEAL HEMOSTATIC MATRIX

## (undated) DEVICE — SNAP KOVER: Brand: UNBRANDED

## (undated) DEVICE — DRAPE MON DISP FOR EXCELSIUSGPS ROBOTIC NAVIGATION PLATFRM

## (undated) DEVICE — GOWN,SIRUS,NONRNF,SETINSLV,2XL,18/CS: Brand: MEDLINE

## (undated) DEVICE — SYSTEM SKIN CLSR 22CM DERMBND PRINEO

## (undated) DEVICE — 4-PORT MANIFOLD: Brand: NEPTUNE 2

## (undated) DEVICE — SOLUTION IRRIG 1000ML STRL H2O USP PLAS POUR BTL

## (undated) DEVICE — SOLUTION IRRIG 1000ML 0.9% SOD CHL USP POUR PLAS BTL

## (undated) DEVICE — SPHERE STEALTH 12PK/TY --

## (undated) DEVICE — NEEDLE HYPO 22GA L1.5IN BLK S STL HUB POLYPR SHLD REG BVL